# Patient Record
Sex: MALE | Race: ASIAN | Employment: FULL TIME | URBAN - METROPOLITAN AREA
[De-identification: names, ages, dates, MRNs, and addresses within clinical notes are randomized per-mention and may not be internally consistent; named-entity substitution may affect disease eponyms.]

---

## 2017-02-08 ENCOUNTER — OFFICE VISIT (OUTPATIENT)
Dept: FAMILY MEDICINE | Facility: CLINIC | Age: 52
End: 2017-02-08
Payer: COMMERCIAL

## 2017-02-08 VITALS
SYSTOLIC BLOOD PRESSURE: 126 MMHG | BODY MASS INDEX: 25.18 KG/M2 | HEIGHT: 69 IN | HEART RATE: 62 BPM | DIASTOLIC BLOOD PRESSURE: 82 MMHG | TEMPERATURE: 97.8 F | OXYGEN SATURATION: 98 % | WEIGHT: 170 LBS

## 2017-02-08 DIAGNOSIS — Z12.11 COLON CANCER SCREENING: ICD-10-CM

## 2017-02-08 DIAGNOSIS — I10 HYPERTENSION GOAL BP (BLOOD PRESSURE) < 140/90: ICD-10-CM

## 2017-02-08 DIAGNOSIS — Z12.5 SCREENING FOR PROSTATE CANCER: ICD-10-CM

## 2017-02-08 DIAGNOSIS — I25.10 CORONARY ARTERY DISEASE INVOLVING NATIVE CORONARY ARTERY OF NATIVE HEART WITHOUT ANGINA PECTORIS: ICD-10-CM

## 2017-02-08 DIAGNOSIS — Z91.018 FOOD ALLERGY: ICD-10-CM

## 2017-02-08 DIAGNOSIS — Z00.00 ENCOUNTER FOR ROUTINE ADULT HEALTH EXAMINATION WITHOUT ABNORMAL FINDINGS: Primary | ICD-10-CM

## 2017-02-08 PROCEDURE — 99396 PREV VISIT EST AGE 40-64: CPT | Performed by: PHYSICIAN ASSISTANT

## 2017-02-08 PROCEDURE — 80053 COMPREHEN METABOLIC PANEL: CPT | Performed by: PHYSICIAN ASSISTANT

## 2017-02-08 PROCEDURE — 80061 LIPID PANEL: CPT | Performed by: PHYSICIAN ASSISTANT

## 2017-02-08 PROCEDURE — 36415 COLL VENOUS BLD VENIPUNCTURE: CPT | Performed by: PHYSICIAN ASSISTANT

## 2017-02-08 NOTE — PROGRESS NOTES
SUBJECTIVE:     CC: Viviana Gilman is an 51 year old male who presents for preventative health visit.     Healthy Habits:    Do you get at least three servings of calcium containing foods daily (dairy, green leafy vegetables, etc.)? yes    Amount of exercise or daily activities, outside of work: 3-4 day(s) per week    Problems taking medications regularly No    Medication side effects: No    Have you had an eye exam in the past two years? yes    Do you see a dentist twice per year? yes    Do you have sleep apnea, excessive snoring or daytime drowsiness? no    Did complete his yearly cardiology visit for hx of CAD and things are going well with BP control and statin therapy. Was told to RTC in 2 years. Had med re-fills, but needs labs today. Non-smoker.        Today's PHQ-2 Score:   PHQ-2 ( 1999 Pfizer) 2/8/2017 1/25/2016   Q1: Little interest or pleasure in doing things 0 0   Q2: Feeling down, depressed or hopeless 0 0   PHQ-2 Score 0 0       Abuse: Current or Past(Physical, Sexual or Emotional)- No  Do you feel safe in your environment - Yes    Social History   Substance Use Topics     Smoking status: Never Smoker      Smokeless tobacco: Never Used     Alcohol Use: 0.0 oz/week     0 Standard drinks or equivalent per week      Comment: Occaisionally     The patient does not drink >3 drinks per day nor >7 drinks per week.    Last PSA: No results found for: PSA    Recent Labs   Lab Test 11/20/12  10/26/11   0720  10/26/11   0520   CHOL   --   111  112   HDL  50  33*  34*   LDL  83  39  40   TRIG  192*  195*  189*   CHOLHDLRATIO  3.4  3.4  3.3       Reviewed orders with patient. Reviewed health maintenance and updated orders accordingly - Yes    All Histories reviewed and updated in Epic.      ROS:  C: NEGATIVE for fever, chills, change in weight  I: NEGATIVE for worrisome rashes, moles or lesions  E: NEGATIVE for vision changes or irritation  ENT: + for occasionally noting a little lip swelling after ingestion of  "pineapple or tar root. He wonders if he has a food allergy. Has just been avoiding these things and hasn't had any issues. We discussed formal allergy testing, but he declines this right now. Does agree to f/u should he have any worsening or wish to pursue this.  R: NEGATIVE for significant cough or SOB  CV: NEGATIVE for chest pain, palpitations or peripheral edema  GI: NEGATIVE for nausea, abdominal pain, heartburn, or change in bowel habits   male: negative for dysuria, hematuria, decreased urinary stream, erectile dysfunction, urethral discharge  M: NEGATIVE for significant arthralgias or myalgia  N: NEGATIVE for weakness, dizziness or paresthesias  P: NEGATIVE for changes in mood or affect    Problem list, Medication list, Allergies, and Medical/Social/Surgical histories reviewed in [facility] and updated as appropriate.  OBJECTIVE:     /82 mmHg  Pulse 62  Temp(Src) 97.8  F (36.6  C) (Oral)  Ht 5' 9\" (1.753 m)  Wt 170 lb (77.111 kg)  BMI 25.09 kg/m2  SpO2 98%  EXAM:  GENERAL: healthy, alert and no distress  EYES: Eyes grossly normal to inspection, PERRL and conjunctivae and sclerae normal  HENT: ear canals and TM's normal, nose and mouth without ulcers or lesions  NECK: no adenopathy, no asymmetry, masses, or scars and thyroid normal to palpation  RESP: lungs clear to auscultation - no rales, rhonchi or wheezes  CV: regular rate and rhythm, normal S1 S2, no S3 or S4, no murmur, click or rub, no peripheral edema and peripheral pulses strong  ABDOMEN: soft, nontender, no hepatosplenomegaly, no masses and bowel sounds normal  MS: no gross musculoskeletal defects noted, no edema  SKIN: no suspicious lesions or rashes  NEURO: Normal strength and tone, mentation intact and speech normal  PSYCH: mentation appears normal, affect normal/bright    ASSESSMENT/PLAN:         ICD-10-CM    1. Encounter for routine adult health examination without abnormal findings Z00.00    2. Coronary artery disease involving native " "coronary artery of native heart without angina pectoris I25.10    3. Hypertension goal BP (blood pressure) < 140/90 I10 Comprehensive metabolic panel (BMP + Alb, Alk Phos, ALT, AST, Total. Bili, TP)   4. Screening for prostate cancer Z12.5 Lipid Profile with reflex to direct LDL   5. Colon cancer screening Z12.11 GASTROENTEROLOGY ADULT REF PROCEDURE ONLY   6. Food allergy - possible to pineapple or tar root Z91.018    See notes in ROS regarding possible allergy.  Will notify of lab results when available.  Continue care bi-annually with cardiology as planned.    COUNSELING:  Reviewed preventive health counseling, as reflected in patient instructions       Regular exercise       Healthy diet/nutrition       Colon cancer screening       Prostate cancer screening         reports that he has never smoked. He has never used smokeless tobacco.    Estimated body mass index is 25.09 kg/(m^2) as calculated from the following:    Height as of this encounter: 5' 9\" (1.753 m).    Weight as of this encounter: 170 lb (77.111 kg).     Counseling Resources:  ATP IV Guidelines  Pooled Cohorts Equation Calculator  FRAX Risk Assessment  ICSI Preventive Guidelines  Dietary Guidelines for Americans, 2010  USDA's MyPlate  ASA Prophylaxis  Lung CA Screening    Dorys Shannon PA-C  [facility]  "

## 2017-02-08 NOTE — NURSING NOTE
"Chief Complaint   Patient presents with     Physical    Pulse 62  Temp(Src) 97.8  F (36.6  C) (Oral)  Ht 5' 9\" (1.753 m)  Wt 170 lb (77.111 kg)  BMI 25.09 kg/m2  SpO2 98% Body Mass Index is Body mass index is 25.09 kg/(m^2).  BP completed using cuff size : regular right arm  Stephanie Crawley MA          "

## 2017-02-08 NOTE — MR AVS SNAPSHOT
After Visit Summary   2/8/2017    Viviana Gilman    MRN: [id number]           Patient Information     Date Of Birth          1965        Visit Information        Provider Department      2/8/2017 10:40 AM Dorys Shannon PA-C [facility]        Today's Diagnoses     Coronary artery disease involving native coronary artery of native heart without angina pectoris    -  1     Hypertension goal BP (blood pressure) < 140/90         Screening for prostate cancer         Colon cancer screening         Food allergy - possible to pineapple or tar root           Care Instructions      Preventive Health Recommendations  Male Ages 50 - 64    Yearly exam:             See your health care provider every year in order to  o   Review health changes.   o   Discuss preventive care.    o   Review your medicines if your doctor has prescribed any.     Have a cholesterol test every 5 years, or more frequently if you are at risk for high cholesterol/heart disease.     Have a diabetes test (fasting glucose) every three years. If you are at risk for diabetes, you should have this test more often.     Have a colonoscopy at age 50, or have a yearly FIT test (stool test). These exams will check for colon cancer.      Talk with your health care provider about whether or not a prostate cancer screening test (PSA) is right for you.    You should be tested each year for STDs (sexually transmitted diseases), if you re at risk.     Shots: Get a flu shot each year. Get a tetanus shot every 10 years.     Nutrition:    Eat at least 5 servings of fruits and vegetables daily.     Eat whole-grain bread, whole-wheat pasta and brown rice instead of white grains and rice.     Talk to your provider about Calcium and Vitamin D.     Lifestyle    Exercise for at least 150 minutes a week (30 minutes a day, 5 days a week). This will help you control your weight and prevent disease.     Limit alcohol to one drink per day.      No smoking.     Wear sunscreen to prevent skin cancer.     See your dentist every six months for an exam and cleaning.     See your eye doctor every 1 to 2 years.            Follow-ups after your visit        Additional Services     GASTROENTEROLOGY ADULT REF PROCEDURE ONLY       Last Lab Result: CREATININE (mg/dL)       Date                     Value                 10/26/2011               0.88             ----------  Body mass index is 25.09 kg/(m^2).      Patient will be contacted to schedule procedure.     Please be aware that coverage of these services is subject to the terms and limitations of your health insurance plan.  Call member services at your health plan with any benefit or coverage questions.  Any procedures must be performed at a [facility] facility OR coordinated by your clinic's referral office.    Please bring the following with you to your appointment:    (1) Any X-Rays, CTs or MRIs which have been performed.  Contact the facility where they were done to arrange for  prior to your scheduled appointment.    (2) List of current medications   (3) This referral request   (4) Any documents/labs given to you for this referral                  Who to contact     If you have questions or need follow up information about today's clinic visit or your schedule please contact [facility] [place] directly at [phone].  Normal or non-critical lab and imaging results will be communicated to you by MyChart, letter or phone within 4 business days after the clinic has received the results. If you do not hear from us within 7 days, please contact the clinic through MyChart or phone. If you have a critical or abnormal lab result, we will notify you by phone as soon as possible.  Submit refill requests through [employer] or call your pharmacy and they will forward the refill request to us. Please allow 3 business days for your refill to be completed.          Additional Information About Your Visit    "     MyChart Information     [employer] lets you send messages to your doctor, view your test results, renew your prescriptions, schedule appointments and more. To sign up, go to [email or web address] . Click on \"Log in\" on the left side of the screen, which will take you to the Welcome page. Then click on \"Sign up Now\" on the right side of the page.     You will be asked to enter the access code listed below, as well as some personal information. Please follow the directions to create your username and password.     Your access code is: [email or web address]  Expires: 2017 11:09 AM     Your access code will  in 90 days. If you need help or a new code, please call your [place] clinic or [phone].        Care EveryWhere ID     This is your [facility] EveryWhere ID. This could be used by other organizations to access your [place] medical records  [id number]        Your Vitals Were     Pulse Temperature Height BMI (Body Mass Index) Pulse Oximetry       62 97.8  F (36.6  C) (Oral) 5' 9\" (1.753 m) 25.09 kg/m2 98%        Blood Pressure from Last 3 Encounters:   17 126/82   16 138/100   16 132/88    Weight from Last 3 Encounters:   17 170 lb (77.111 kg)   16 184 lb (83.462 kg)   16 176 lb (79.833 kg)              We Performed the Following     Comprehensive metabolic panel (BMP + Alb, Alk Phos, ALT, AST, Total. Bili, TP)     GASTROENTEROLOGY ADULT REF PROCEDURE ONLY     Lipid Profile with reflex to direct LDL        Primary Care Provider    [facility]       No address on file        Thank you!     Thank you for choosing [facility]  for your care. Our goal is always to provide you with excellent care. Hearing back from our patients is one way we can continue to improve our services. Please take a few minutes to complete the written survey that you may receive in the mail after your visit with us. Thank you!             Your Updated Medication List - Protect " others around you: Learn how to safely use, store and throw away your medicines at www.disposemymeds.org.          This list is accurate as of: 2/8/17 11:17 AM.  Always use your most recent med list.                   Brand Name Dispense Instructions for use    aspirin 325 MG tablet      Take 325 mg by mouth every morning.       atorvastatin 40 MG tablet    LIPITOR    90 tablet    Take 1 tablet (40 mg) by mouth daily       lisinopril 5 MG tablet    PRINIVIL/ZESTRIL    90 tablet    Take 1 tablet (5 mg) by mouth daily

## 2017-02-08 NOTE — LETTER
[facility]     [address] [place] [address]                                           [phone]   February 16, 2017    Viviana Gilman  [address] [place] [id number]      Dear Viviana,    Here is a summary of your recent test results:    -Liver and gallbladder tests are normal. (ALT,AST, Alk phos, bilirubin), kidney function is normal (Cr, GFR), Sodium is normal, Potassium is normal, Calcium is normal, Glucose is normal (diabetes screening test).   -LDL(bad) cholesterol level is mildly elevated.  -HDL(good) cholesterol level is normal.  -Triglycerides are elevated which can increase your heart disease risk.  A diet high in fat and simple carbohydrates, genetics and being overweight can contribute to this. ADVISE: Exercise, weight control, and omega-3 fatty acids (fish oil) 1323-8721 mg daily are helpful to improve this.  Rechecking your cholesterol in 12 months is recommended (lipid w/ LDL reflex, DX: hypertriglyceridemia - 272.1).    Your test results are enclosed.      Thank you for choosing [facility].  We appreciate the opportunity to serve you and look forward to supporting your healthcare needs in the future.    If you have any questions or concerns, please call me or my staff at [phone].    Best regards,    Dorys Shannon PA-C      Results for orders placed or performed in visit on 02/08/17   Comprehensive metabolic panel (BMP + Alb, Alk Phos, ALT, AST, Total. Bili, TP)   Result Value Ref Range    Sodium 141 133 - 144 mmol/L    Potassium 4.2 3.4 - 5.3 mmol/L    Chloride 106 94 - 109 mmol/L    Carbon Dioxide 30 20 - 32 mmol/L    Anion Gap 5 3 - 14 mmol/L    Glucose 101 (H) 70 - 99 mg/dL    Urea Nitrogen 16 7 - 30 mg/dL    Creatinine 0.84 0.66 - 1.25 mg/dL    GFR Estimate >90  Non  GFR Calc   >60 mL/min/1.7m2    GFR Estimate If Black >90   GFR Calc   >60 mL/min/1.7m2    Calcium 9.4 8.5 - 10.1 mg/dL    Bilirubin Total 0.6  0.2 - 1.3 mg/dL    Albumin 4.2 3.4 - 5.0 g/dL    Protein Total 8.2 6.8 - 8.8 g/dL    Alkaline Phosphatase 69 40 - 150 U/L    ALT 32 0 - 70 U/L    AST 18 0 - 45 U/L   Lipid Profile with reflex to direct LDL   Result Value Ref Range    Cholesterol 212 (H) <200 mg/dL    Triglycerides 196 (H) <150 mg/dL    HDL Cholesterol 43 >39 mg/dL    LDL Cholesterol Calculated 130 (H) <100 mg/dL    Non HDL Cholesterol 169 (H) <130 mg/dL

## 2017-02-09 LAB
ALBUMIN SERPL-MCNC: 4.2 G/DL (ref 3.4–5)
ALP SERPL-CCNC: 69 U/L (ref 40–150)
ALT SERPL W P-5'-P-CCNC: 32 U/L (ref 0–70)
ANION GAP SERPL CALCULATED.3IONS-SCNC: 5 MMOL/L (ref 3–14)
AST SERPL W P-5'-P-CCNC: 18 U/L (ref 0–45)
BILIRUB SERPL-MCNC: 0.6 MG/DL (ref 0.2–1.3)
BUN SERPL-MCNC: 16 MG/DL (ref 7–30)
CALCIUM SERPL-MCNC: 9.4 MG/DL (ref 8.5–10.1)
CHLORIDE SERPL-SCNC: 106 MMOL/L (ref 94–109)
CHOLEST SERPL-MCNC: 212 MG/DL
CO2 SERPL-SCNC: 30 MMOL/L (ref 20–32)
CREAT SERPL-MCNC: 0.84 MG/DL (ref 0.66–1.25)
GFR SERPL CREATININE-BSD FRML MDRD: ABNORMAL ML/MIN/1.7M2
GLUCOSE SERPL-MCNC: 101 MG/DL (ref 70–99)
HDLC SERPL-MCNC: 43 MG/DL
LDLC SERPL CALC-MCNC: 130 MG/DL
NONHDLC SERPL-MCNC: 169 MG/DL
POTASSIUM SERPL-SCNC: 4.2 MMOL/L (ref 3.4–5.3)
PROT SERPL-MCNC: 8.2 G/DL (ref 6.8–8.8)
SODIUM SERPL-SCNC: 141 MMOL/L (ref 133–144)
TRIGL SERPL-MCNC: 196 MG/DL

## 2017-02-16 NOTE — PROGRESS NOTES
Please call or write patient with the following results:    -Liver and gallbladder tests are normal. (ALT,AST, Alk phos, bilirubin), kidney function is normal (Cr, GFR), Sodium is normal, Potassium is normal, Calcium is normal, Glucose is normal (diabetes screening test).   -LDL(bad) cholesterol level is mildly elevated.  -HDL(good) cholesterol level is normal.  -Triglycerides are elevated which can increase your heart disease risk.  A diet high in fat and simple carbohydrates, genetics and being overweight can contribute to this. ADVISE: Exercise, weight control, and omega-3 fatty acids (fish oil) 7452-6760 mg daily are helpful to improve this.  Rechecking your cholesterol in 12 months is recommended (lipid w/ LDL reflex, DX: hypertriglyceridemia - 272.1).    Electronically Signed By: Dorys Shannon PA-C

## 2017-02-23 ENCOUNTER — TELEPHONE (OUTPATIENT)
Dept: GASTROENTEROLOGY | Facility: CLINIC | Age: 52
End: 2017-02-23

## 2017-04-12 NOTE — TELEPHONE ENCOUNTER
Third attempt to reach patient to schedule Colonoscopy. Not Scheduled at [facility]. Left Messages.   
artificial rupture

## 2017-09-25 ENCOUNTER — TRANSFERRED RECORDS (OUTPATIENT)
Dept: HEALTH INFORMATION MANAGEMENT | Facility: CLINIC | Age: 52
End: 2017-09-25

## 2017-09-25 DIAGNOSIS — I10 ESSENTIAL HYPERTENSION: ICD-10-CM

## 2017-09-25 DIAGNOSIS — I25.2 OLD MYOCARDIAL INFARCTION: ICD-10-CM

## 2017-09-25 RX ORDER — ATORVASTATIN CALCIUM 40 MG/1
40 TABLET, FILM COATED ORAL DAILY
Qty: 90 TABLET | Refills: 0 | Status: SHIPPED | OUTPATIENT
Start: 2017-09-25 | End: 2018-04-11

## 2017-09-25 RX ORDER — LISINOPRIL 5 MG/1
5 TABLET ORAL DAILY
Qty: 90 TABLET | Refills: 0 | Status: SHIPPED | OUTPATIENT
Start: 2017-09-25 | End: 2018-04-11

## 2018-04-11 DIAGNOSIS — I10 ESSENTIAL HYPERTENSION: ICD-10-CM

## 2018-04-11 DIAGNOSIS — I25.2 OLD MYOCARDIAL INFARCTION: ICD-10-CM

## 2018-04-11 RX ORDER — LISINOPRIL 5 MG/1
5 TABLET ORAL DAILY
Qty: 90 TABLET | Refills: 0 | Status: SHIPPED | OUTPATIENT
Start: 2018-04-11 | End: 2018-06-20

## 2018-04-11 RX ORDER — ATORVASTATIN CALCIUM 40 MG/1
40 TABLET, FILM COATED ORAL DAILY
Qty: 90 TABLET | Refills: 0 | Status: ON HOLD | OUTPATIENT
Start: 2018-04-11 | End: 2019-03-06

## 2018-06-20 ENCOUNTER — OFFICE VISIT (OUTPATIENT)
Dept: CARDIOLOGY | Facility: CLINIC | Age: 53
End: 2018-06-20
Payer: COMMERCIAL

## 2018-06-20 VITALS
BODY MASS INDEX: 25.33 KG/M2 | DIASTOLIC BLOOD PRESSURE: 82 MMHG | WEIGHT: 171 LBS | HEART RATE: 68 BPM | HEIGHT: 69 IN | SYSTOLIC BLOOD PRESSURE: 135 MMHG

## 2018-06-20 DIAGNOSIS — I25.2 OLD MYOCARDIAL INFARCTION: ICD-10-CM

## 2018-06-20 DIAGNOSIS — E78.2 MIXED HYPERLIPIDEMIA: Primary | ICD-10-CM

## 2018-06-20 DIAGNOSIS — I10 ESSENTIAL HYPERTENSION: ICD-10-CM

## 2018-06-20 DIAGNOSIS — I10 HYPERTENSION GOAL BP (BLOOD PRESSURE) < 140/90: ICD-10-CM

## 2018-06-20 PROCEDURE — 99214 OFFICE O/P EST MOD 30 MIN: CPT | Performed by: INTERNAL MEDICINE

## 2018-06-20 RX ORDER — LISINOPRIL 10 MG/1
10 TABLET ORAL DAILY
Qty: 90 TABLET | Refills: 3 | Status: ON HOLD | OUTPATIENT
Start: 2018-06-20 | End: 2019-03-06

## 2018-06-20 NOTE — MR AVS SNAPSHOT
"              After Visit Summary   6/20/2018    Viviana Gilman    MRN: [id number]           Patient Information     Date Of Birth          1965        Visit Information        Provider Department      6/20/2018 2:15 PM Amauri Fleming MD [facility]   [place]        Today's Diagnoses     Mixed hyperlipidemia    -  1    Hypertension goal BP (blood pressure) < 140/90        Old myocardial infarction        Essential hypertension           Follow-ups after your visit        Who to contact     If you have questions or need follow up information about today's clinic visit or your schedule please contact [facility]   [place] directly at [phone].  Normal or non-critical lab and imaging results will be communicated to you by MyChart, letter or phone within 4 business days after the clinic has received the results. If you do not hear from us within 7 days, please contact the clinic through MyChart or phone. If you have a critical or abnormal lab result, we will notify you by phone as soon as possible.  Submit refill requests through [employer] or call your pharmacy and they will forward the refill request to us. Please allow 3 business days for your refill to be completed.          Additional Information About Your Visit        Care EveryWhere ID     This is your Care EveryWhere ID. This could be used by other organizations to access your [facility] medical records  [id number]        Your Vitals Were     Pulse Height BMI (Body Mass Index)             68 1.753 m (5' 9\") 25.25 kg/m2          Blood Pressure from Last 3 Encounters:   06/20/18 135/82   02/08/17 126/82   03/11/16 (!) 138/100    Weight from Last 3 Encounters:   06/20/18 77.6 kg (171 lb)   02/08/17 77.1 kg (170 lb)   03/11/16 83.5 kg (184 lb)              Today, you had the following     No orders found for display         Today's Medication Changes          These changes are accurate as of 6/20/18  " 2:51 PM.  If you have any questions, ask your nurse or doctor.               These medicines have changed or have updated prescriptions.        Dose/Directions    lisinopril 10 MG tablet   Commonly known as:  PRINIVIL/ZESTRIL   This may have changed:    - medication strength  - how much to take   Used for:  Essential hypertension   Changed by:  Amauri Fleming MD        Dose:  10 mg   Take 1 tablet (10 mg) by mouth daily   Quantity:  90 tablet   Refills:  3         Stop taking these medicines if you haven't already. Please contact your care team if you have questions.     aspirin 325 MG tablet   Stopped by:  Amauri Fleming MD                Where to get your medicines      These medications were sent to [facility] [place], [place] - [address]  [address] [facility] [address]     Phone:  [phone]     lisinopril 10 MG tablet                Primary Care Provider Office Phone # Fax #    Dorys Shannon PA-C [phone] [address]  [place] [address]        Equal Access to Services     [facility]: Hadii aad ku hadasho Soomaali, waaxda luqadaha, qaybta kaalmada adeegyada, waxay adonisin hayfilemonn marshall romo . So [facility] [phone].    ATENCIÓN: Si habla español, tiene a toribio disposición servicios gratuitos de asistencia lingüística. Llame al [phone].    We comply with applicable federal civil rights laws and [place] laws. We do not discriminate on the basis of race, color, national origin, age, disability, sex, sexual orientation, or gender identity.            Thank you!     Thank you for choosing [facility]  for your care. Our goal is always to provide you with excellent care. Hearing back from our patients is one way we can continue to improve our services. Please take a few minutes to complete the written survey that you may receive in the mail after your visit with us. Thank  you!             Your Updated Medication List - Protect others around you: Learn how to safely use, store and throw away your medicines at www.disposemymeds.org.          This list is accurate as of 6/20/18  2:51 PM.  Always use your most recent med list.                   Brand Name Dispense Instructions for use Diagnosis    ASPIRIN PO      Take 81 mg by mouth daily        atorvastatin 40 MG tablet    LIPITOR    90 tablet    Take 1 tablet (40 mg) by mouth daily    Old myocardial infarction, Essential hypertension       lisinopril 10 MG tablet    PRINIVIL/ZESTRIL    90 tablet    Take 1 tablet (10 mg) by mouth daily    Essential hypertension

## 2018-06-20 NOTE — LETTER
"6/20/2018    Dorys Shannon PA-C  [address]  [facility] [place], [place] [address]    RE: Viviana Gilman       Dear Colleague,    I had the pleasure of seeing Viviana Gilman in the [facility].    Cardiology Progress Note          Assessment and Plan:     1. Coronary artery disease, premature with PCI to the LAD and obtuse marginal 2011    Continue medical management.  Blood pressure suboptimal.  Increase lisinopril from 5 mg to 10 mg.  If patient remains asymptomatic, may follow-up with his primary physician for refills and see us as needed.    This note was transcribed using electronic voice recognition software and there may be typographical errors present.                Interval History:   The patient is a very pleasant 52 year old whom I have been seeing for coronary artery disease status post PCI of the LAD and obtuse marginal in 2011.  He has intermittently stopped taking his medications over the years.  He comes in for follow-up at this time.  He feels well without any exertional discomfort.  He assures me that he is taking his atorvastatin and lisinopril 5 mg daily.                     Review of Systems:   Review of Systems:  Skin:  Negative     Eyes:  Positive for glasses  ENT:  Negative    Respiratory:  Negative    Cardiovascular:  Negative    Gastroenterology: Negative    Genitourinary:  not assessed    Musculoskeletal:  Negative    Neurologic:  Negative    Psychiatric:  Negative    Heme/Lymph/Imm:  Negative    Endocrine:  Negative                Physical Exam:     Vitals: /82  Pulse 68  Ht 1.753 m (5' 9\")  Wt 77.6 kg (171 lb)  BMI 25.25 kg/m2  Constitutional:  cooperative, alert and oriented, well developed, well nourished, in no acute distress        Skin:  warm and dry to the touch, no apparent skin lesions or masses noted        Head:  normocephalic, no masses or lesions        Eyes:  pupils equal and round, conjunctivae and lids unremarkable, sclera white, no xanthalasma, " EOMS intact, no nystagmus        ENT:  no pallor or cyanosis, dentition good        Neck:  JVP normal;no carotid bruit        Chest:  normal breath sounds, clear to auscultation, normal A-P diameter, normal symmetry, normal respiratory excursion, no use of accessory muscles        Cardiac: regular rhythm;no murmurs, gallops or rubs detected                  Abdomen:      benign    Extremities and Back:  no deformities, clubbing, cyanosis, erythema observed;no edema        Neurological:  no gross motor deficits;affect appropriate                 Medications:     Current Outpatient Prescriptions   Medication Sig Dispense Refill     ASPIRIN PO Take 81 mg by mouth daily       lisinopril (PRINIVIL/ZESTRIL) 10 MG tablet Take 1 tablet (10 mg) by mouth daily 90 tablet 3     atorvastatin (LIPITOR) 40 MG tablet Take 1 tablet (40 mg) by mouth daily 90 tablet 0     [DISCONTINUED] lisinopril (PRINIVIL/ZESTRIL) 5 MG tablet Take 1 tablet (5 mg) by mouth daily 90 tablet 0                Data:   All laboratory data reviewed  No results found for this or any previous visit (from the past 24 hour(s)).    All laboratory data reviewed  Lab Results   Component Value Date    CHOL 212 02/08/2017     Lab Results   Component Value Date    HDL 43 02/08/2017     Lab Results   Component Value Date     02/08/2017     Lab Results   Component Value Date    TRIG 196 02/08/2017     Lab Results   Component Value Date    CHOLHDLRATIO 3.4 11/20/2012     No results found for: TSH  Last Basic Metabolic Panel:  Lab Results   Component Value Date     02/08/2017      Lab Results   Component Value Date    POTASSIUM 4.2 02/08/2017     Lab Results   Component Value Date    CHLORIDE 106 02/08/2017     Lab Results   Component Value Date    JAY 9.4 02/08/2017     Lab Results   Component Value Date    CO2 30 02/08/2017     Lab Results   Component Value Date    BUN 16 02/08/2017     Lab Results   Component Value Date    CR 0.84 02/08/2017     Lab  Results   Component Value Date     02/08/2017     Lab Results   Component Value Date    WBC 7.6 10/26/2011     Lab Results   Component Value Date    RBC 5.26 10/26/2011     Lab Results   Component Value Date    HGB 13.9 10/26/2011     Lab Results   Component Value Date    HCT 42.4 10/26/2011     Lab Results   Component Value Date    MCV 81 10/26/2011     Lab Results   Component Value Date    MCH 26.4 10/26/2011     Lab Results   Component Value Date    MCHC 32.8 10/26/2011     Lab Results   Component Value Date    RDW 14.1 10/26/2011     Lab Results   Component Value Date     10/26/2011       Thank you for allowing me to participate in the care of your patient.    Sincerely,     Amauri Fleming MD     [facility]

## 2018-06-20 NOTE — PROGRESS NOTES
"Cardiology Progress Note          Assessment and Plan:     1. Coronary artery disease, premature with PCI to the LAD and obtuse marginal 2011    Continue medical management.  Blood pressure suboptimal.  Increase lisinopril from 5 mg to 10 mg.  If patient remains asymptomatic, may follow-up with his primary physician for refills and see us as needed.    This note was transcribed using electronic voice recognition software and there may be typographical errors present.                Interval History:   The patient is a very pleasant 52 year old whom I have been seeing for coronary artery disease status post PCI of the LAD and obtuse marginal in 2011.  He has intermittently stopped taking his medications over the years.  He comes in for follow-up at this time.  He feels well without any exertional discomfort.  He assures me that he is taking his atorvastatin and lisinopril 5 mg daily.                     Review of Systems:   Review of Systems:  Skin:  Negative     Eyes:  Positive for glasses  ENT:  Negative    Respiratory:  Negative    Cardiovascular:  Negative    Gastroenterology: Negative    Genitourinary:  not assessed    Musculoskeletal:  Negative    Neurologic:  Negative    Psychiatric:  Negative    Heme/Lymph/Imm:  Negative    Endocrine:  Negative                Physical Exam:     Vitals: /82  Pulse 68  Ht 1.753 m (5' 9\")  Wt 77.6 kg (171 lb)  BMI 25.25 kg/m2  Constitutional:  cooperative, alert and oriented, well developed, well nourished, in no acute distress        Skin:  warm and dry to the touch, no apparent skin lesions or masses noted        Head:  normocephalic, no masses or lesions        Eyes:  pupils equal and round, conjunctivae and lids unremarkable, sclera white, no xanthalasma, EOMS intact, no nystagmus        ENT:  no pallor or cyanosis, dentition good        Neck:  JVP normal;no carotid bruit        Chest:  normal breath sounds, clear to auscultation, normal A-P diameter, normal " symmetry, normal respiratory excursion, no use of accessory muscles        Cardiac: regular rhythm;no murmurs, gallops or rubs detected                  Abdomen:      benign    Extremities and Back:  no deformities, clubbing, cyanosis, erythema observed;no edema        Neurological:  no gross motor deficits;affect appropriate                 Medications:     Current Outpatient Prescriptions   Medication Sig Dispense Refill     ASPIRIN PO Take 81 mg by mouth daily       lisinopril (PRINIVIL/ZESTRIL) 10 MG tablet Take 1 tablet (10 mg) by mouth daily 90 tablet 3     atorvastatin (LIPITOR) 40 MG tablet Take 1 tablet (40 mg) by mouth daily 90 tablet 0     [DISCONTINUED] lisinopril (PRINIVIL/ZESTRIL) 5 MG tablet Take 1 tablet (5 mg) by mouth daily 90 tablet 0                Data:   All laboratory data reviewed  No results found for this or any previous visit (from the past 24 hour(s)).    All laboratory data reviewed  Lab Results   Component Value Date    CHOL 212 02/08/2017     Lab Results   Component Value Date    HDL 43 02/08/2017     Lab Results   Component Value Date     02/08/2017     Lab Results   Component Value Date    TRIG 196 02/08/2017     Lab Results   Component Value Date    CHOLHDLRATIO 3.4 11/20/2012     No results found for: TSH  Last Basic Metabolic Panel:  Lab Results   Component Value Date     02/08/2017      Lab Results   Component Value Date    POTASSIUM 4.2 02/08/2017     Lab Results   Component Value Date    CHLORIDE 106 02/08/2017     Lab Results   Component Value Date    JAY 9.4 02/08/2017     Lab Results   Component Value Date    CO2 30 02/08/2017     Lab Results   Component Value Date    BUN 16 02/08/2017     Lab Results   Component Value Date    CR 0.84 02/08/2017     Lab Results   Component Value Date     02/08/2017     Lab Results   Component Value Date    WBC 7.6 10/26/2011     Lab Results   Component Value Date    RBC 5.26 10/26/2011     Lab Results   Component Value  Date    HGB 13.9 10/26/2011     Lab Results   Component Value Date    HCT 42.4 10/26/2011     Lab Results   Component Value Date    MCV 81 10/26/2011     Lab Results   Component Value Date    MCH 26.4 10/26/2011     Lab Results   Component Value Date    MCHC 32.8 10/26/2011     Lab Results   Component Value Date    RDW 14.1 10/26/2011     Lab Results   Component Value Date     10/26/2011

## 2018-09-12 ENCOUNTER — OFFICE VISIT (OUTPATIENT)
Dept: FAMILY MEDICINE | Facility: CLINIC | Age: 53
End: 2018-09-12
Payer: COMMERCIAL

## 2018-09-12 VITALS
OXYGEN SATURATION: 97 % | TEMPERATURE: 97.8 F | BODY MASS INDEX: 25.7 KG/M2 | HEART RATE: 64 BPM | DIASTOLIC BLOOD PRESSURE: 84 MMHG | WEIGHT: 174 LBS | SYSTOLIC BLOOD PRESSURE: 144 MMHG

## 2018-09-12 DIAGNOSIS — M79.672 PAIN OF LEFT HEEL: Primary | ICD-10-CM

## 2018-09-12 PROCEDURE — 99213 OFFICE O/P EST LOW 20 MIN: CPT | Performed by: FAMILY MEDICINE

## 2018-09-12 NOTE — MR AVS SNAPSHOT
After Visit Summary   9/12/2018    Viviana Gilman    MRN: [id number]           Patient Information     Date Of Birth          1965        Visit Information        Provider Department      9/12/2018 2:40 PM Emil Lucero MD [facility]        Today's Diagnoses     Pain of left heel    -  1       Follow-ups after your visit        Additional Services     PHYSICAL THERAPY REFERRAL                 Follow-up notes from your care team     Return if symptoms worsen or fail to improve.      Who to contact     If you have questions or need follow up information about today's clinic visit or your schedule please contact [facility] at [phone].  Normal or non-critical lab and imaging results will be communicated to you by MyChart, letter or phone within 4 business days after the clinic has received the results. If you do not hear from us within 7 days, please contact the clinic through MyChart or phone. If you have a critical or abnormal lab result, we will notify you by phone as soon as possible.  Submit refill requests through in[employer] or call your pharmacy and they will forward the refill request to us. Please allow 3 business days for your refill to be completed.          Additional Information About Your Visit        Care EveryWhere ID     This is your Care EveryWhere ID. This could be used by other organizations to access your [place] medical records  [id number]        Your Vitals Were     Pulse Temperature Pulse Oximetry BMI (Body Mass Index)          64 97.8  F (36.6  C) (Oral) 97% 25.7 kg/m2         Blood Pressure from Last 3 Encounters:   09/12/18 144/84   06/20/18 135/82   02/08/17 126/82    Weight from Last 3 Encounters:   09/12/18 174 lb (78.9 kg)   06/20/18 171 lb (77.6 kg)   02/08/17 170 lb (77.1 kg)              We Performed the Following     PHYSICAL THERAPY REFERRAL        Primary Care Provider Office Phone # Fax #    Dorys Vanessa PA-C  [phone]       [address]  [place] [address]        Equal Access to Services     SKIP SALEH : Hadii naveen lucero manuel Sotone, waaxda luqadaha, qaybta kaalmada khadijah, yee dianelebron felix. So [facility] [phone].    ATENCIÓN: Si habla español, tiene a toribio disposición servicios gratuitos de asistencia lingüística. Llame al [phone].    We comply with applicable federal civil rights laws and [place] laws. We do not discriminate on the basis of race, color, national origin, age, disability, sex, sexual orientation, or gender identity.            Thank you!     Thank you for choosing [facility]  for your care. Our goal is always to provide you with excellent care. Hearing back from our patients is one way we can continue to improve our services. Please take a few minutes to complete the written survey that you may receive in the mail after your visit with us. Thank you!             Your Updated Medication List - Protect others around you: Learn how to safely use, store and throw away your medicines at www.disposemymeds.org.          This list is accurate as of 9/12/18  3:37 PM.  Always use your most recent med list.                   Brand Name Dispense Instructions for use Diagnosis    ASPIRIN PO      Take 81 mg by mouth daily        atorvastatin 40 MG tablet    LIPITOR    90 tablet    Take 1 tablet (40 mg) by mouth daily    Old myocardial infarction, Essential hypertension       lisinopril 10 MG tablet    PRINIVIL/ZESTRIL    90 tablet    Take 1 tablet (10 mg) by mouth daily    Essential hypertension

## 2018-09-12 NOTE — PROGRESS NOTES
SUBJECTIVE:   Viviana Gilman is a 53 year old male who presents to clinic today for the following health issues:      Joint Pain    Onset: 4 days    Description:   Location: left heel  Character: Sharp    Intensity: 10/10 when stepping on it    Progression of Symptoms: worse    Accompanying Signs & Symptoms:  Other symptoms: none    History:   Previous similar pain: no       Precipitating factors:   Trauma or overuse: no     Alleviating factors:  Improved by: nothing    Therapies Tried and outcome: ibuprofen      ROS:  General, neuro, sleep, psych, musculoskeletal system otherwise negative.    /84  Pulse 64  Temp 97.8  F (36.6  C) (Oral)  Wt 174 lb (78.9 kg)  SpO2 97%  BMI 25.7 kg/m2    L heel tender at achilles insertion and around   No plantar pain.   Squeeze neg   Normal ROM and strength.  NV intact    ASSESSMENT:  1. Pain of left heel  Ice, rest, advil  Pt instructed to come back to the clinic for worsening sx  Discussed bursitis vs tendonitis     - PHYSICAL THERAPY REFERRAL

## 2019-03-04 ENCOUNTER — APPOINTMENT (OUTPATIENT)
Dept: GENERAL RADIOLOGY | Facility: CLINIC | Age: 54
DRG: 247 | End: 2019-03-04
Payer: COMMERCIAL

## 2019-03-04 ENCOUNTER — ALLIED HEALTH/NURSE VISIT (OUTPATIENT)
Dept: NURSING | Facility: CLINIC | Age: 54
End: 2019-03-04
Payer: COMMERCIAL

## 2019-03-04 ENCOUNTER — HOSPITAL ENCOUNTER (INPATIENT)
Facility: CLINIC | Age: 54
LOS: 3 days | Discharge: HOME | DRG: 247 | End: 2019-03-07
Attending: INTERNAL MEDICINE | Admitting: HOSPITALIST
Payer: COMMERCIAL

## 2019-03-04 VITALS
DIASTOLIC BLOOD PRESSURE: 94 MMHG | SYSTOLIC BLOOD PRESSURE: 154 MMHG | OXYGEN SATURATION: 98 % | BODY MASS INDEX: 26.14 KG/M2 | HEART RATE: 64 BPM | TEMPERATURE: 97.8 F | WEIGHT: 177 LBS | RESPIRATION RATE: 14 BRPM

## 2019-03-04 DIAGNOSIS — I25.10 CORONARY ARTERY DISEASE INVOLVING NATIVE CORONARY ARTERY WITHOUT ANGINA PECTORIS: ICD-10-CM

## 2019-03-04 DIAGNOSIS — R07.9 CHEST PAIN, UNSPECIFIED TYPE: ICD-10-CM

## 2019-03-04 DIAGNOSIS — I24.9 ACS (ACUTE CORONARY SYNDROME) (H): Primary | ICD-10-CM

## 2019-03-04 DIAGNOSIS — I10 ESSENTIAL HYPERTENSION: ICD-10-CM

## 2019-03-04 DIAGNOSIS — I21.4 NSTEMI (NON-ST ELEVATED MYOCARDIAL INFARCTION) (H): ICD-10-CM

## 2019-03-04 DIAGNOSIS — I25.2 OLD MYOCARDIAL INFARCTION: ICD-10-CM

## 2019-03-04 DIAGNOSIS — I25.10 CORONARY ARTERY DISEASE INVOLVING NATIVE CORONARY ARTERY OF NATIVE HEART WITHOUT ANGINA PECTORIS: ICD-10-CM

## 2019-03-04 DIAGNOSIS — I25.2 OLD MYOCARDIAL INFARCTION: Primary | ICD-10-CM

## 2019-03-04 LAB
ANION GAP SERPL CALCULATED.3IONS-SCNC: 6 MMOL/L (ref 3–14)
BASOPHILS # BLD AUTO: 0.1 10E9/L (ref 0–0.2)
BASOPHILS NFR BLD AUTO: 0.9 %
BUN SERPL-MCNC: 16 MG/DL (ref 7–30)
CALCIUM SERPL-MCNC: 9.4 MG/DL (ref 8.5–10.1)
CHLORIDE SERPL-SCNC: 104 MMOL/L (ref 94–109)
CO2 SERPL-SCNC: 28 MMOL/L (ref 20–32)
CREAT SERPL-MCNC: 0.94 MG/DL (ref 0.66–1.25)
DIFFERENTIAL METHOD BLD: NORMAL
EOSINOPHIL # BLD AUTO: 0.2 10E9/L (ref 0–0.7)
EOSINOPHIL NFR BLD AUTO: 2.4 %
ERYTHROCYTE [DISTWIDTH] IN BLOOD BY AUTOMATED COUNT: 14.2 % (ref 10–15)
ERYTHROCYTE [DISTWIDTH] IN BLOOD BY AUTOMATED COUNT: 14.3 % (ref 10–15)
GFR SERPL CREATININE-BSD FRML MDRD: >90 ML/MIN/{1.73_M2}
GLUCOSE SERPL-MCNC: 91 MG/DL (ref 70–99)
HCT VFR BLD AUTO: 45.7 % (ref 40–53)
HCT VFR BLD AUTO: 47.9 % (ref 40–53)
HGB BLD-MCNC: 14.7 G/DL (ref 13.3–17.7)
HGB BLD-MCNC: 15.2 G/DL (ref 13.3–17.7)
IMM GRANULOCYTES # BLD: 0 10E9/L (ref 0–0.4)
IMM GRANULOCYTES NFR BLD: 0.2 %
INTERPRETATION ECG - MUSE: NORMAL
LMWH PPP CHRO-ACNC: 0.47 IU/ML
LYMPHOCYTES # BLD AUTO: 2.5 10E9/L (ref 0.8–5.3)
LYMPHOCYTES NFR BLD AUTO: 38.6 %
MCH RBC QN AUTO: 26.8 PG (ref 26.5–33)
MCH RBC QN AUTO: 27.3 PG (ref 26.5–33)
MCHC RBC AUTO-ENTMCNC: 31.7 G/DL (ref 31.5–36.5)
MCHC RBC AUTO-ENTMCNC: 32.2 G/DL (ref 31.5–36.5)
MCV RBC AUTO: 84 FL (ref 78–100)
MCV RBC AUTO: 85 FL (ref 78–100)
MONOCYTES # BLD AUTO: 0.4 10E9/L (ref 0–1.3)
MONOCYTES NFR BLD AUTO: 6.6 %
NEUTROPHILS # BLD AUTO: 3.3 10E9/L (ref 1.6–8.3)
NEUTROPHILS NFR BLD AUTO: 51.3 %
NRBC # BLD AUTO: 0 10*3/UL
NRBC BLD AUTO-RTO: 0 /100
PLATELET # BLD AUTO: 164 10E9/L (ref 150–450)
PLATELET # BLD AUTO: 165 10E9/L (ref 150–450)
POTASSIUM SERPL-SCNC: 3.8 MMOL/L (ref 3.4–5.3)
RBC # BLD AUTO: 5.39 10E12/L (ref 4.4–5.9)
RBC # BLD AUTO: 5.68 10E12/L (ref 4.4–5.9)
SODIUM SERPL-SCNC: 138 MMOL/L (ref 133–144)
TROPONIN I BLD-MCNC: 0.1 UG/L (ref 0–0.08)
TROPONIN I SERPL-MCNC: 0.11 UG/L (ref 0–0.04)
TROPONIN I SERPL-MCNC: 0.15 UG/L (ref 0–0.04)
TROPONIN I SERPL-MCNC: 0.16 UG/L (ref 0–0.04)
WBC # BLD AUTO: 6.4 10E9/L (ref 4–11)
WBC # BLD AUTO: 6.7 10E9/L (ref 4–11)

## 2019-03-04 PROCEDURE — 93005 ELECTROCARDIOGRAM TRACING: CPT

## 2019-03-04 PROCEDURE — 25000128 H RX IP 250 OP 636: Performed by: PHYSICIAN ASSISTANT

## 2019-03-04 PROCEDURE — 84484 ASSAY OF TROPONIN QUANT: CPT | Performed by: PHYSICIAN ASSISTANT

## 2019-03-04 PROCEDURE — 36415 COLL VENOUS BLD VENIPUNCTURE: CPT | Performed by: HOSPITALIST

## 2019-03-04 PROCEDURE — 25000132 ZZH RX MED GY IP 250 OP 250 PS 637: Performed by: PHYSICIAN ASSISTANT

## 2019-03-04 PROCEDURE — 12000000 ZZH R&B MED SURG/OB

## 2019-03-04 PROCEDURE — 99221 1ST HOSP IP/OBS SF/LOW 40: CPT | Performed by: INTERNAL MEDICINE

## 2019-03-04 PROCEDURE — 96365 THER/PROPH/DIAG IV INF INIT: CPT

## 2019-03-04 PROCEDURE — 36415 COLL VENOUS BLD VENIPUNCTURE: CPT | Performed by: PHYSICIAN ASSISTANT

## 2019-03-04 PROCEDURE — 25000132 ZZH RX MED GY IP 250 OP 250 PS 637: Performed by: HOSPITALIST

## 2019-03-04 PROCEDURE — 85025 COMPLETE CBC W/AUTO DIFF WBC: CPT | Performed by: PHYSICIAN ASSISTANT

## 2019-03-04 PROCEDURE — 85520 HEPARIN ASSAY: CPT | Performed by: HOSPITALIST

## 2019-03-04 PROCEDURE — 99285 EMERGENCY DEPT VISIT HI MDM: CPT | Mod: 25

## 2019-03-04 PROCEDURE — 71046 X-RAY EXAM CHEST 2 VIEWS: CPT

## 2019-03-04 PROCEDURE — 84484 ASSAY OF TROPONIN QUANT: CPT

## 2019-03-04 PROCEDURE — 80048 BASIC METABOLIC PNL TOTAL CA: CPT | Performed by: PHYSICIAN ASSISTANT

## 2019-03-04 PROCEDURE — 99223 1ST HOSP IP/OBS HIGH 75: CPT | Mod: AI | Performed by: PHYSICIAN ASSISTANT

## 2019-03-04 PROCEDURE — 25000132 ZZH RX MED GY IP 250 OP 250 PS 637: Performed by: INTERNAL MEDICINE

## 2019-03-04 PROCEDURE — 85027 COMPLETE CBC AUTOMATED: CPT | Performed by: PHYSICIAN ASSISTANT

## 2019-03-04 RX ORDER — NALOXONE HYDROCHLORIDE 0.4 MG/ML
.1-.4 INJECTION, SOLUTION INTRAMUSCULAR; INTRAVENOUS; SUBCUTANEOUS
Status: DISCONTINUED | OUTPATIENT
Start: 2019-03-04 | End: 2019-03-07 | Stop reason: HOSPADM

## 2019-03-04 RX ORDER — ACETAMINOPHEN 650 MG/1
650 SUPPOSITORY RECTAL EVERY 4 HOURS PRN
Status: DISCONTINUED | OUTPATIENT
Start: 2019-03-04 | End: 2019-03-07 | Stop reason: HOSPADM

## 2019-03-04 RX ORDER — ONDANSETRON 2 MG/ML
4 INJECTION INTRAMUSCULAR; INTRAVENOUS EVERY 6 HOURS PRN
Status: DISCONTINUED | OUTPATIENT
Start: 2019-03-04 | End: 2019-03-07 | Stop reason: HOSPADM

## 2019-03-04 RX ORDER — NITROGLYCERIN 0.4 MG/1
0.4 TABLET SUBLINGUAL
Status: COMPLETED | OUTPATIENT
Start: 2019-03-04 | End: 2019-03-04

## 2019-03-04 RX ORDER — ASPIRIN 81 MG/1
81 TABLET, CHEWABLE ORAL DAILY
Status: DISCONTINUED | OUTPATIENT
Start: 2019-03-05 | End: 2019-03-05 | Stop reason: DRUGHIGH

## 2019-03-04 RX ORDER — PROCHLORPERAZINE 25 MG
25 SUPPOSITORY, RECTAL RECTAL EVERY 12 HOURS PRN
Status: DISCONTINUED | OUTPATIENT
Start: 2019-03-04 | End: 2019-03-07 | Stop reason: HOSPADM

## 2019-03-04 RX ORDER — CARVEDILOL 3.12 MG/1
3.12 TABLET ORAL 2 TIMES DAILY WITH MEALS
Status: DISCONTINUED | OUTPATIENT
Start: 2019-03-04 | End: 2019-03-07 | Stop reason: HOSPADM

## 2019-03-04 RX ORDER — ALUMINA, MAGNESIA, AND SIMETHICONE 2400; 2400; 240 MG/30ML; MG/30ML; MG/30ML
30 SUSPENSION ORAL EVERY 4 HOURS PRN
Status: DISCONTINUED | OUTPATIENT
Start: 2019-03-04 | End: 2019-03-07 | Stop reason: HOSPADM

## 2019-03-04 RX ORDER — LIDOCAINE 40 MG/G
CREAM TOPICAL
Status: DISCONTINUED | OUTPATIENT
Start: 2019-03-04 | End: 2019-03-07 | Stop reason: HOSPADM

## 2019-03-04 RX ORDER — ATORVASTATIN CALCIUM 40 MG/1
80 TABLET, FILM COATED ORAL DAILY
Status: DISCONTINUED | OUTPATIENT
Start: 2019-03-04 | End: 2019-03-07 | Stop reason: HOSPADM

## 2019-03-04 RX ORDER — LORAZEPAM 0.5 MG/1
0.5 TABLET ORAL EVERY 4 HOURS PRN
Status: DISCONTINUED | OUTPATIENT
Start: 2019-03-04 | End: 2019-03-07 | Stop reason: HOSPADM

## 2019-03-04 RX ORDER — LISINOPRIL 10 MG/1
10 TABLET ORAL DAILY
Status: DISCONTINUED | OUTPATIENT
Start: 2019-03-04 | End: 2019-03-07 | Stop reason: HOSPADM

## 2019-03-04 RX ORDER — ASPIRIN 81 MG/1
81 TABLET ORAL EVERY EVENING
Status: ON HOLD | COMMUNITY
End: 2019-03-06

## 2019-03-04 RX ORDER — SODIUM CHLORIDE 9 MG/ML
INJECTION, SOLUTION INTRAVENOUS CONTINUOUS
Status: DISCONTINUED | OUTPATIENT
Start: 2019-03-04 | End: 2019-03-04

## 2019-03-04 RX ORDER — NITROGLYCERIN 0.4 MG/1
0.4 TABLET SUBLINGUAL EVERY 5 MIN PRN
Status: DISCONTINUED | OUTPATIENT
Start: 2019-03-04 | End: 2019-03-04

## 2019-03-04 RX ORDER — LISINOPRIL 5 MG/1
5 TABLET ORAL DAILY
Status: DISCONTINUED | OUTPATIENT
Start: 2019-03-04 | End: 2019-03-04

## 2019-03-04 RX ORDER — ACETAMINOPHEN 325 MG/1
650 TABLET ORAL EVERY 4 HOURS PRN
Status: DISCONTINUED | OUTPATIENT
Start: 2019-03-04 | End: 2019-03-05

## 2019-03-04 RX ORDER — ASPIRIN 81 MG/1
324 TABLET, CHEWABLE ORAL ONCE
Status: COMPLETED | OUTPATIENT
Start: 2019-03-04 | End: 2019-03-04

## 2019-03-04 RX ORDER — MORPHINE SULFATE 2 MG/ML
2-4 INJECTION, SOLUTION INTRAMUSCULAR; INTRAVENOUS
Status: DISCONTINUED | OUTPATIENT
Start: 2019-03-04 | End: 2019-03-07 | Stop reason: HOSPADM

## 2019-03-04 RX ORDER — PROCHLORPERAZINE MALEATE 5 MG
10 TABLET ORAL EVERY 6 HOURS PRN
Status: DISCONTINUED | OUTPATIENT
Start: 2019-03-04 | End: 2019-03-07 | Stop reason: HOSPADM

## 2019-03-04 RX ORDER — ONDANSETRON 4 MG/1
4 TABLET, ORALLY DISINTEGRATING ORAL EVERY 6 HOURS PRN
Status: DISCONTINUED | OUTPATIENT
Start: 2019-03-04 | End: 2019-03-07 | Stop reason: HOSPADM

## 2019-03-04 RX ORDER — NITROGLYCERIN 0.4 MG/1
0.4 TABLET SUBLINGUAL EVERY 5 MIN PRN
Status: DISCONTINUED | OUTPATIENT
Start: 2019-03-04 | End: 2019-03-05

## 2019-03-04 RX ADMIN — ASPIRIN 81 MG 324 MG: 81 TABLET ORAL at 12:50

## 2019-03-04 RX ADMIN — CARVEDILOL 3.12 MG: 3.12 TABLET, FILM COATED ORAL at 18:33

## 2019-03-04 RX ADMIN — NITROGLYCERIN 0.4 MG: 0.4 TABLET SUBLINGUAL at 13:32

## 2019-03-04 RX ADMIN — NITROGLYCERIN 0.4 MG: 0.4 TABLET SUBLINGUAL at 13:06

## 2019-03-04 RX ADMIN — Medication 4400 UNITS: at 13:49

## 2019-03-04 RX ADMIN — ALUMINUM HYDROXIDE, MAGNESIUM HYDROXIDE, AND DIMETHICONE 30 ML: 400; 400; 40 SUSPENSION ORAL at 19:20

## 2019-03-04 RX ADMIN — LISINOPRIL 10 MG: 10 TABLET ORAL at 19:21

## 2019-03-04 RX ADMIN — HEPARIN SODIUM 900 UNITS/HR: 10000 INJECTION, SOLUTION INTRAVENOUS at 13:48

## 2019-03-04 RX ADMIN — NITROGLYCERIN 0.4 MG: 0.4 TABLET SUBLINGUAL at 12:50

## 2019-03-04 RX ADMIN — ATORVASTATIN CALCIUM 80 MG: 40 TABLET, FILM COATED ORAL at 19:21

## 2019-03-04 RX ADMIN — ACETAMINOPHEN 650 MG: 325 TABLET, FILM COATED ORAL at 23:41

## 2019-03-04 ASSESSMENT — ACTIVITIES OF DAILY LIVING (ADL)
RETIRED_COMMUNICATION: 0-->UNDERSTANDS/COMMUNICATES WITHOUT DIFFICULTY
COGNITION: 0 - NO COGNITION ISSUES REPORTED
WHICH_OF_THE_ABOVE_FUNCTIONAL_RISKS_HAD_A_RECENT_ONSET_OR_CHANGE?: AMBULATION
TOILETING: 0-->INDEPENDENT
ADLS_ACUITY_SCORE: 11
PRIOR_FUNCTIONAL_LEVEL_COMMENT: INDEPENDENT
RETIRED_EATING: 0-->INDEPENDENT
DRESS: 0-->INDEPENDENT
SWALLOWING: 0-->SWALLOWS FOODS/LIQUIDS WITHOUT DIFFICULTY
ADLS_ACUITY_SCORE: 16
BATHING: 0-->INDEPENDENT

## 2019-03-04 ASSESSMENT — ENCOUNTER SYMPTOMS
RHINORRHEA: 1
SHORTNESS OF BREATH: 1
FEVER: 0
CHILLS: 0
COUGH: 0

## 2019-03-04 ASSESSMENT — MIFFLIN-ST. JEOR
SCORE: 1638.71
SCORE: 1636.44

## 2019-03-04 NOTE — Clinical Note
The first balloon was inserted into the left anterior descending and middle left anterior descending.  Max pressure = 12 carito. Total duration = 30 seconds.     Max pressure = 15 carito. Total duration = 20 seconds.    Balloon reinflated a second time: Max pressure = 15 carito. Total duration = 20 seconds.  Balloon reinflated a third time: Max pressure = 20 carito. Total duration = 30 seconds.  Balloon reinflated a fourth time: Max pressure = 22 carito. Total duration = 30 seconds.

## 2019-03-04 NOTE — ED NOTES
.  [facility]  ED Nurse Handoff Report    Viviana Gilman is a 53 year old male   ED Chief complaint: Chest Pain  . ED Diagnosis:   Final diagnoses:   NSTEMI (non-ST elevated myocardial infarction) (H)     Allergies: No Known Allergies    Code Status: Full Code  Activity level - Baseline/Home:  Independent. Activity Level - Current:  Assist of one Lift room needed: No. Bariatric: No   Needed: No   Isolation: No. Infection: Not Applicable.     Vital Signs:   Vitals:    03/04/19 1330 03/04/19 1335 03/04/19 1340 03/04/19 1345   BP: (!) 157/97 (!) 136/92 133/89 130/86   Pulse: 60 58 58 63   Resp: 10 15 13 12   Temp:       TempSrc:       SpO2: 100% 99% 97% 98%   Weight:       Height:           Cardiac Rhythm:  ,      Pain level:    Patient confused: No. Patient Falls Risk: No.   Elimination Status: Has not voided   Patient Report - Initial Complaint: Chest Pain. Focused Assessment: Viviana Gilman is a 53 year old male with a history of coronary artery disease, Hypertension, hyperlipidemia, myocardial infarction, who presents with concern for chest pain. The patient reports that yesterday evening he was relaxing at home walking to the living room when he noted onset of left sided chest pain. This persisted through the night and into this morning , and after he dropped his kid off at school and went to work he noticed an exacerbation of the pain which began radiating down his left arm and to his back through his left shoulder. He reports shortness of breath as well. He states that this sensation is the exact same as his previous anterior STEMI 8 years ago, in 2011, for which he received a stent in the proximal LAD, and several weeks later had another stent placed to circumflex artery. He follows Dr. Fleming for cardiology. He states he is active at work and does not experience chest pain with this . He denies any recent strenuous activity. He denies leg pain, swelling, cough , fever, chills. He notes a slight  rhinorrhea over the last several days but denies all other concerns. (physician note)     Cardiac/PE/DVT Risk Factors:  The patient has a history of hypertension, hyperlipidemia, but not diabetes, or smoking. He reports no family history of heart disease. The patient denies any personal or familial history of PE, DVT, or clotting disorder. The patient reports no recent travel, surgery, or other immobilizations.       Tests Performed: Labs, Chest Xray. Abnormal Results: .  Labs Ordered and Resulted from Time of ED Arrival Up to the Time of Departure from the ED   TROPONIN I - Abnormal; Notable for the following components:       Result Value    Troponin I ES 0.154 (*)     All other components within normal limits   TROPONIN POCT - Abnormal; Notable for the following components:    Troponin I 0.10 (*)     All other components within normal limits   CBC WITH PLATELETS DIFFERENTIAL   BASIC METABOLIC PANEL   PERIPHERAL IV CATHETER   CARDIAC CONTINUOUS MONITORING   ISTAT TROPONIN NURSING POCT   MEASURE WEIGHT   NOTIFY PHYSICIAN   NOTIFY PHYSICIAN   PLATELETS MONITORED PER HEPARIN TREATMENT PROTOCOL (FOR MEANINGFUL USE   .  XR Chest 2 Views   Final Result   IMPRESSION: PA and lateral views of the chest. Lungs are clear. Heart   is normal in size. No effusions are evident. No pneumothorax.      LAYTON MCCLOUD MD      .   Treatments provided: Aspirin 324mg, nitroglycerin x3, Heparin    Family Comments: Not present  OBS brochure/video discussed/provided to patient:  N/A  ED Medications:   Medications   sodium chloride 0.9% infusion (not administered)   nitroGLYcerin (NITROSTAT) sublingual tablet 0.4 mg (0.4 mg Sublingual Given 3/4/19 1332)   heparin infusion 25,000 units in 0.45% NaCl 250 mL (900 Units/hr Intravenous New Bag 3/4/19 1348)   nitroGLYcerin (NITROSTAT) sublingual tablet 0.4 mg (0.4 mg Sublingual Given 3/4/19 1250)   aspirin (ASA) chewable tablet 324 mg (324 mg Oral Given 3/4/19 1250)   heparin Loading Dose bolus  dose from infusion pump 4,400 Units (4,400 Units Intravenous Given 3/4/19 1349)     Drips infusing:  Yes  For the majority of the shift, the patient's behavior Green. Interventions performed were Call Light, Frequent checks.     Severe Sepsis OR Septic Shock Diagnosis Present: No      ED Nurse Name/Phone Number: Kalina Chao,   2:17 PM    RECEIVING UNIT ED HANDOFF REVIEW    Above ED Nurse Handoff Report was reviewed: Yes  Reviewed by: India Reyna on March 4, 2019 at 2:28 PM

## 2019-03-04 NOTE — PLAN OF CARE
PRIMARY DIAGNOSIS:  Chest Pain  GOALS TO BE MET BEFORE DISCHARGE:  1. Ruled out acute coronary syndrome (negative or stable Troponin):  No. Admission Troponin Positive with Repeat draws tonight.  2. Pain Status: Improved-controlled with oral pain medications.  3. Appropriate provocative testing performed: No. Diagnostic test pending  - Stress Test Procedure: per provider Orders  - Interpretation of cardiac rhythm per telemetry tech: Sinus Rhythm    4. Cleared by Consultants (if applicable): No.  5. Return to near baseline physical activity: No. Intermittent pain/discomfort when ambulating.  Discharge Planner Nurse   Safe discharge environment identified: No  Barriers to discharge: Yes  Patient alert and orient x4. (+) CSM BUE/BLE. No respiratory or cardiac distress voiced or noted. Patient continues on Heparin drip with heparin 10a blood level monitoring.       Entered by: AGUILAR NEWMAN 03/04/2019      Please review provider order for any additional goals.   Nurse to notify provider when goals have been met and patient is ready for discharge.

## 2019-03-04 NOTE — CONSULTS
Cardiology consultation dictated    Assessment NSTEMI. Old history AWMI PCI LAD with COURTNEY and elective stent M1 2011 in setting of HTN and dyslipidemia.     Recommendation  1) Resume asa, statin, bb, ACE  2) agree with IVUFH  3) CAG, possible PCI 3/5  4) TTE    I have examined the patient, reviewed the history, medications and pre procedural tests. I have explained to the patient the risks of death, MI, stroke, hematoma, possible urgent bypass surgery for failed PCI, use of stents, thienopyridine agents, possible peripheral vascular complications, arrhythmia, the use of FFR in clinical decision-making and alternative of medical therapy alone in regards to left heart catheterization, left ventriculography, coronary angiography, and possible percutaneous coronary intervention. The patient voiced understanding and wishes to proceed. The patient has a good right radial pulse, normal ulnar pulse and a normal Jose Maria's sign.

## 2019-03-04 NOTE — ED PROVIDER NOTES
History     Chief Complaint:  Chest pain    HPI   Viviana Gilman is a 53 year old male with a history of coronary artery disease, Hypertension, hyperlipidemia, myocardial infarction, who presents with concern for chest pain. The patient reports that yesterday evening he was relaxing at home walking to the living room when he noted onset of left sided chest pain. This persisted through the night and into this morning , and after he dropped his kid off at school and went to work he noticed an exacerbation of the pain which began radiating down his left arm and to his back through his left shoulder. He reports shortness of breath as well. He states that this sensation is the exact same as his previous anterior STEMI 8 years ago, in 2011, for which he received a stent in the proximal LAD, and several weeks later had another stent placed to circumflex artery. He follows Dr. Fleming for cardiology. He states he is active at work and does not experience chest pain with this . He denies any recent strenuous activity. He denies leg pain, swelling, cough , fever, chills. He notes a slight rhinorrhea over the last several days but denies all other concerns.     Cardiac/PE/DVT Risk Factors:  The patient has a history of hypertension, hyperlipidemia, but not diabetes, or smoking. He reports no family history of heart disease. The patient denies any personal or familial history of PE, DVT, or clotting disorder. The patient reports no recent travel, surgery, or other immobilizations.     Allergies:  NKDA    Medications:    Aspirin  Lipitor  Lisinopril    Past Medical History:    CAD  HTN  Malaria  HLD  Old MI    Past Surgical History:    ACL repair    Family History:    DM type II  HTN  HLD    Social History:  Negative for tobacco use.  Positive for alcohol use.     Review of Systems   Constitutional: Negative for chills and fever.   HENT: Positive for rhinorrhea.    Respiratory: Positive for shortness of breath. Negative for cough.   "  Cardiovascular: Positive for chest pain. Negative for leg swelling.   All other systems reviewed and are negative.    Physical Exam     Patient Vitals for the past 24 hrs:   BP Temp Temp src Pulse Heart Rate Resp SpO2 Height Weight   03/04/19 1522 -- -- -- -- -- -- -- 1.753 m (5' 9\") --   03/04/19 1450 160/90 97.1  F (36.2  C) -- -- 58 16 98 % -- 79.6 kg (175 lb 8 oz)   03/04/19 1435 136/84 -- -- 51 51 11 97 % -- --   03/04/19 1430 131/86 -- -- 50 53 15 97 % -- --   03/04/19 1420 137/87 -- -- 52 53 15 100 % -- --   03/04/19 1405 (!) 142/96 -- -- 58 65 15 100 % -- --   03/04/19 1400 (!) 132/105 -- -- 62 61 10 98 % -- --   03/04/19 1355 135/83 -- -- 56 59 8 96 % -- --   03/04/19 1345 130/86 -- -- 63 63 12 98 % -- --   03/04/19 1340 133/89 -- -- 58 60 13 97 % -- --   03/04/19 1335 (!) 136/92 -- -- 58 58 15 99 % -- --   03/04/19 1330 (!) 157/97 -- -- 60 57 10 100 % -- --   03/04/19 1255 (!) 145/108 -- -- 68 70 23 98 % -- --   03/04/19 1230 (!) 199/108 -- -- 61 -- -- 100 % -- --   03/04/19 1224 (!) 169/116 97.8  F (36.6  C) Temporal 61 -- 18 96 % 1.753 m (5' 9\") 79.8 kg (176 lb)     Physical Exam  General: In no acute distress. Normal mood and affect.  Skin: Good turgor, no rash, no unusual bruising or prominent lesions.  HEENT: Head: Normocephalic, atraumatic, no visible masses.   Eyes: Conjunctiva clear.  Ears: EACs clear, TMs translucent.   Nose: No external lesions, mucosa non-inflamed, septum and turbinates normal.   Throat/pharynx: Mucous membranes moist, no mucosal lesions.   Neck: Supple, without lymphadenopathy.  Cardiac: Normal rate and regular rhythm, no murmur or gallop.  No reproducible chest discomfort.  Lungs: Clear to auscultation.  Abdomen: Abdomen soft, non-tender. No rebound tenderness of guarding.   Musculoskeletal: Normal gait and station. No calf tenderness or swelling.   Neurologic: Oriented x 3. GCS: 15.  Psychiatric: Intact recent and remote memory, judgment and insight, normal mood and affect. "     Emergency Department Course   ECG:    Indication: chest pain  Time: 1234  Vent. Rate 57 bpm. NM interval 154. QRS duration 90. QT/QTc 448/436. P-R-T axis 57 11 13.  Sinus bradycardia, otherwise normal ECG.. Read time: 1234    Imaging:  Radiographic findings were communicated with the patient who voiced understanding of the findings.    XR Chest 2 views:   PA and lateral views of the chest. Lungs are clear. Heart  is normal in size. No effusions are evident. No pneumothorax. As per radiology.     Laboratory:    CBC: WBC: 6.4, HGB: 15.2, PLT: 164  BMP:  All WNL (Creatinine: 0.94)    1245 Troponin: 0.1    Interventions:    1250 Nitroglycerine 0.4 mg Sublingual   Aspirin 324 mg   1309 Nitroglycerine 0.4 mg Sublingual   1332 Nitroglycerine 0.4 mg Sublingual  1348 Heparin infusion 900 units/hr IV  1349 Heparin loading dose 4400 units IV    Emergency Department Course:  Nursing notes and vitals reviewed. (1247) I performed an exam of the patient as documented above.     IV inserted. Medicine administered as documented above. Blood drawn. This was sent to the lab for further testing, results above.    The patient was sent for a CXR while in the emergency department, findings above.     (1330) I rechecked the patient and discussed the results of his workup thus far.     (1343)  I consulted with Vanessa Muñoz PA-C of the hospitalist services. They are in agreement to accept the patient for admission.    Findings and plan explained to the Patient who consents to admission. Discussed the patient with Dr. Johnson, who will admit the patient to a cardiac-tele bed for further monitoring, evaluation, and treatment.      Impression & Plan      Medical Decision Making:  Viviana Gilman is a 53 year old male who presented the ED today for evaluation of chest pain.  Details of the patient's history can be noted in the HPI.  Differential diagnosis included ACS, dissection, pneumothorax, pneumonia, PE, costochondritis, pericarditis, panic  attack, gastric reflux, amongst others.  Workup here in the ED indicated elevated troponin at 0.154.  The remaining laboratory analysis was without significant abnormalities.  Chest x-ray clear.  Bradycardia on ECG, no ischemic changes.  Patient symptoms likely consistent with NSTEMI.  Patient's heart score is 4.  Patient was low risk on Wells criteria.  It was not PERC negative due to his age, however, low concern for PE at this time is he is not hypoxic, no tachycardia, no other risk factors.  Patient seems consistent with his prior ACS.  With his elevated troponin, heparin was ordered.  He was also given 3 doses of sublingual nitroglycerin and a full dose of aspirin here.  Patient was pain-free at the time of his admission.  He is also vitally stable.  I did discuss the patient's case with Vanessa Muñoz PA-C who agreed to admit the patient in the hospital for Dr. Johnson.  Cardiology consult and likely cardiac catheterization.  Continuous cardiac monitoring, serial troponins.  All questions were answered prior to the patient's admission.  He was in agreement with the treatment plan as stated above.    Diagnosis:    ICD-10-CM    1. NSTEMI (non-ST elevated myocardial infarction) (H) I21.4 CBC with platelets     Troponin I   2. Chest pain, unspecified type R07.9        Disposition:  Admitted to cardiac-tele bed under care of Dr. Alex Castro Disclosure:  I, Amauri Tee, am serving as a scribe on 3/4/2019 at 12:48 PM to personally document services performed by Jasson Rojas PA-C based on my observations and the provider's statements to me.     Amauri Tee  3/4/2019   [facility] EMERGENCY DEPARTMENT    This was created at least in part with a voice recognition software. Mistakes/typos may be present.       Crystal Spaulding PA  03/04/19 173

## 2019-03-04 NOTE — Clinical Note
Multiple balloon inflation. The first balloon was inserted into the left anterior descending and middle left anterior descending.  Max pressure = 10 carito. Total duration = 30 seconds.     The second balloon was inserted into the Left Anterior Descending and left anterior decending diagonal. Max pressure = 8 carito. Total duration = 30 seconds.   Max pressure = 8 carito. Total duration = 30 seconds.

## 2019-03-04 NOTE — Clinical Note
The first balloon was inserted into the left anterior descending and middle left anterior descending.  Max pressure = 9 carito. Total duration = 30 seconds.

## 2019-03-04 NOTE — Clinical Note
The first balloon was inserted into the left anterior descending and middle left anterior descending.  Max pressure = 15 carito. Total duration = 15 seconds.

## 2019-03-04 NOTE — Clinical Note
The first balloon was inserted into the left anterior descending and proximal left anterior descending.  Max pressure = 14 carito. Total duration = 30 seconds.

## 2019-03-04 NOTE — Clinical Note
The first balloon was inserted into the left anterior descending and middle left anterior descending.  Max pressure = 14 carito. Total duration = 30 seconds.     Max pressure = 20 carito. Total duration = 30 seconds.    Balloon reinflated a second time: Max pressure = 20 carito. Total duration = 30 seconds.  Balloon reinflated a third time: Max pressure = 22 carito. Total duration = 30 seconds.  Balloon reinflated a fourth time: Max pressure = 22 carito. Total duration = 20 seconds.

## 2019-03-04 NOTE — PHARMACY-ADMISSION MEDICATION HISTORY
Admission medication history interview status for this patient is complete. See [facility] admission navigator for allergy information, prior to admission medications and immunization status.     Medication history interview source(s):Patient  Medication history resources (including written lists, pill bottles, clinic record):None    Changes made to PTA medication list:  Added: none  Deleted: none  Changed: none    Actions taken by pharmacist (provider contacted, etc):None     Additional medication history information:None    Medication reconciliation/reorder completed by provider prior to medication history? No    Do you take OTC medications (eg tylenol, ibuprofen, fish oil, eye/ear drops, etc)? N(Y/N)    For patients on insulin therapy: N (Y/N)        Prior to Admission medications    Medication Sig Last Dose Taking? Auth Provider   aspirin 81 MG EC tablet Take 81 mg by mouth every evening 3/3/2019 at Unknown time Yes Unknown, Entered By History   atorvastatin (LIPITOR) 40 MG tablet Take 1 tablet (40 mg) by mouth daily 3/3/2019 at pm Yes Amauri Fleming MD   lisinopril (PRINIVIL/ZESTRIL) 10 MG tablet Take 1 tablet (10 mg) by mouth daily 3/3/2019 at pm Yes Amauri Fleming MD

## 2019-03-04 NOTE — Clinical Note
The first balloon was inserted into the left anterior descending and left anterior descending diagonal.  Max pressure = 4 carito. Total duration = 10 seconds.     Max pressure = 4 carito. Total duration = 30 seconds.    Balloon reinflated a second time: Max pressure = 4 carito. Total duration = 30 seconds.  Balloon reinflated a third time: Max pressure = 10 carito. Total duration = 45 seconds.

## 2019-03-04 NOTE — Clinical Note
Stent deployed in the middle left anterior descending. Max pressure = 9 carito. Total duration = 30 seconds. Balloon reinflated a second time: Max pressure = 10 carito. Total duration = 15 seconds.

## 2019-03-04 NOTE — ED TRIAGE NOTES
ABCs intact. Pt c/o intermittent L sided CP radiating down L arm since yesterday. Pt states pain with movement.

## 2019-03-04 NOTE — PROGRESS NOTES
Patient presents to clinic as a walkin with chest pain:    CHEST PAIN     Onset: maybe a little bit yesterday, worse this morning when dropping child off at school                                   Description:   Location:  left side  Character: sharp  Radiation: down left arm - some tingling in the back of upper arm  Duration: intermittent - only when moving    Intensity: moderate to severe    Progression of Symptoms:  worsening and intermittent    Accompanying Signs & Symptoms:  Shortness of breath: no   Sweating: no   Nausea/vomiting: no   Lightheadedness: YES- sometimes, but unclear if this is related to pain  Palpitations: no  Fever/Chills: no   Cough: YES- maybe a little bit  Heartburn: no     History:   Family history of heart disease YES- personal history and father had heart disease - passed away at 84 years of age  Tobacco use: no    Precipitating factors:     Notices it more with movement  Worse with exertion: YES  Worse with deep breaths :  YES - maybe a little bit  Related to food: no    Alleviating factors:  Sitting down it goes away       Therapies Tried and outcome: None    Patient with significant cardiac history. Had MI about 8 years ago with stent placement.      Message handled by Nurse Triage with [facility] - provider name: Tyrese Caldera DO. Given patient's history, would recommend proceeding to ER for further evaluation. OK to transport in personal vehicle as patient is otherwise stable with exception of elevated BP.     Patient verbalized understanding and agrees with plan.    REYNA FinchN, RN

## 2019-03-04 NOTE — PLAN OF CARE
Patient transferred at approximately 1445. Patient settled into room. VSS. Denies pain at this time. Heparin stopped.

## 2019-03-05 ENCOUNTER — APPOINTMENT (OUTPATIENT)
Dept: CARDIOLOGY | Facility: CLINIC | Age: 54
DRG: 247 | End: 2019-03-05
Attending: INTERNAL MEDICINE
Payer: COMMERCIAL

## 2019-03-05 ENCOUNTER — ANESTHESIA EVENT (OUTPATIENT)
Dept: CARDIOLOGY | Facility: CLINIC | Age: 54
End: 2019-03-05

## 2019-03-05 ENCOUNTER — ANESTHESIA (OUTPATIENT)
Dept: CARDIOLOGY | Facility: CLINIC | Age: 54
End: 2019-03-05

## 2019-03-05 LAB
CHOLEST SERPL-MCNC: 186 MG/DL
ERYTHROCYTE [DISTWIDTH] IN BLOOD BY AUTOMATED COUNT: 14.2 % (ref 10–15)
GLUCOSE BLDC GLUCOMTR-MCNC: 110 MG/DL (ref 70–99)
HCT VFR BLD AUTO: 44.6 % (ref 40–53)
HDLC SERPL-MCNC: 47 MG/DL
HGB BLD-MCNC: 14.3 G/DL (ref 13.3–17.7)
KCT BLD-ACNC: 241 SEC (ref 75–150)
KCT BLD-ACNC: 261 SEC (ref 75–150)
KCT BLD-ACNC: 477 SEC (ref 75–150)
LDLC SERPL CALC-MCNC: 117 MG/DL
LMWH PPP CHRO-ACNC: 0.22 IU/ML
LMWH PPP CHRO-ACNC: 0.22 IU/ML
MCH RBC QN AUTO: 26.7 PG (ref 26.5–33)
MCHC RBC AUTO-ENTMCNC: 32.1 G/DL (ref 31.5–36.5)
MCV RBC AUTO: 83 FL (ref 78–100)
NONHDLC SERPL-MCNC: 139 MG/DL
PLATELET # BLD AUTO: 157 10E9/L (ref 150–450)
RBC # BLD AUTO: 5.36 10E12/L (ref 4.4–5.9)
TRIGL SERPL-MCNC: 110 MG/DL
WBC # BLD AUTO: 6.3 10E9/L (ref 4–11)

## 2019-03-05 PROCEDURE — 99152 MOD SED SAME PHYS/QHP 5/>YRS: CPT | Performed by: INTERNAL MEDICINE

## 2019-03-05 PROCEDURE — C9600 PERC DRUG-EL COR STENT SING: HCPCS | Mod: LD | Performed by: INTERNAL MEDICINE

## 2019-03-05 PROCEDURE — 0270346 DILATION OF CORONARY ARTERY, ONE ARTERY, BIFURCATION, WITH DRUG-ELUTING INTRALUMINAL DEVICE, PERCUTANEOUS APPROACH: ICD-10-PCS | Performed by: INTERNAL MEDICINE

## 2019-03-05 PROCEDURE — 87640 STAPH A DNA AMP PROBE: CPT | Performed by: INTERNAL MEDICINE

## 2019-03-05 PROCEDURE — 80061 LIPID PANEL: CPT | Performed by: INTERNAL MEDICINE

## 2019-03-05 PROCEDURE — 25000132 ZZH RX MED GY IP 250 OP 250 PS 637: Performed by: INTERNAL MEDICINE

## 2019-03-05 PROCEDURE — 02703ZZ DILATION OF CORONARY ARTERY, ONE ARTERY, PERCUTANEOUS APPROACH: ICD-10-PCS | Performed by: INTERNAL MEDICINE

## 2019-03-05 PROCEDURE — C1874 STENT, COATED/COV W/DEL SYS: HCPCS | Performed by: INTERNAL MEDICINE

## 2019-03-05 PROCEDURE — 25800030 ZZH RX IP 258 OP 636: Performed by: INTERNAL MEDICINE

## 2019-03-05 PROCEDURE — 92920 PRQ TRLUML C ANGIOP 1ART&/BR: CPT | Mod: LD | Performed by: INTERNAL MEDICINE

## 2019-03-05 PROCEDURE — 36415 COLL VENOUS BLD VENIPUNCTURE: CPT | Performed by: INTERNAL MEDICINE

## 2019-03-05 PROCEDURE — 00000146 ZZHCL STATISTIC GLUCOSE BY METER IP

## 2019-03-05 PROCEDURE — 25000132 ZZH RX MED GY IP 250 OP 250 PS 637: Performed by: HOSPITALIST

## 2019-03-05 PROCEDURE — 93010 ELECTROCARDIOGRAM REPORT: CPT | Performed by: INTERNAL MEDICINE

## 2019-03-05 PROCEDURE — 93454 CORONARY ARTERY ANGIO S&I: CPT | Mod: 26 | Performed by: INTERNAL MEDICINE

## 2019-03-05 PROCEDURE — C1760 CLOSURE DEV, VASC: HCPCS | Performed by: INTERNAL MEDICINE

## 2019-03-05 PROCEDURE — 99232 SBSQ HOSP IP/OBS MODERATE 35: CPT | Performed by: INTERNAL MEDICINE

## 2019-03-05 PROCEDURE — 25000125 ZZHC RX 250: Performed by: INTERNAL MEDICINE

## 2019-03-05 PROCEDURE — 93454 CORONARY ARTERY ANGIO S&I: CPT | Performed by: INTERNAL MEDICINE

## 2019-03-05 PROCEDURE — 25500064 ZZH RX 255 OP 636: Performed by: HOSPITALIST

## 2019-03-05 PROCEDURE — 93005 ELECTROCARDIOGRAM TRACING: CPT

## 2019-03-05 PROCEDURE — 85347 COAGULATION TIME ACTIVATED: CPT

## 2019-03-05 PROCEDURE — 87641 MR-STAPH DNA AMP PROBE: CPT | Performed by: INTERNAL MEDICINE

## 2019-03-05 PROCEDURE — 25000128 H RX IP 250 OP 636: Performed by: PHYSICIAN ASSISTANT

## 2019-03-05 PROCEDURE — 92921 ZZHC PRQ TRLUML CORONARY ANGIOPLASTY ADDL BRANCH: CPT | Mod: LD | Performed by: INTERNAL MEDICINE

## 2019-03-05 PROCEDURE — 36415 COLL VENOUS BLD VENIPUNCTURE: CPT | Performed by: HOSPITALIST

## 2019-03-05 PROCEDURE — 40000264 ECHOCARDIOGRAM COMPLETE

## 2019-03-05 PROCEDURE — 25000128 H RX IP 250 OP 636: Performed by: HOSPITALIST

## 2019-03-05 PROCEDURE — 99153 MOD SED SAME PHYS/QHP EA: CPT | Performed by: INTERNAL MEDICINE

## 2019-03-05 PROCEDURE — B2111ZZ FLUOROSCOPY OF MULTIPLE CORONARY ARTERIES USING LOW OSMOLAR CONTRAST: ICD-10-PCS | Performed by: INTERNAL MEDICINE

## 2019-03-05 PROCEDURE — 92928 PRQ TCAT PLMT NTRAC ST 1 LES: CPT | Mod: LD | Performed by: INTERNAL MEDICINE

## 2019-03-05 PROCEDURE — 20000003 ZZH R&B ICU

## 2019-03-05 PROCEDURE — 25000128 H RX IP 250 OP 636: Performed by: INTERNAL MEDICINE

## 2019-03-05 PROCEDURE — 85520 HEPARIN ASSAY: CPT | Performed by: PHYSICIAN ASSISTANT

## 2019-03-05 PROCEDURE — 27210802 ZZH SHEATH CR1: Performed by: INTERNAL MEDICINE

## 2019-03-05 PROCEDURE — 99207 ZZC CDG-MDM COMPONENT: MEETS LOW - DOWN CODED: CPT | Performed by: INTERNAL MEDICINE

## 2019-03-05 PROCEDURE — 93306 TTE W/DOPPLER COMPLETE: CPT | Mod: 26 | Performed by: INTERNAL MEDICINE

## 2019-03-05 PROCEDURE — 36415 COLL VENOUS BLD VENIPUNCTURE: CPT | Performed by: PHYSICIAN ASSISTANT

## 2019-03-05 PROCEDURE — C1887 CATHETER, GUIDING: HCPCS | Performed by: INTERNAL MEDICINE

## 2019-03-05 PROCEDURE — C1769 GUIDE WIRE: HCPCS | Performed by: INTERNAL MEDICINE

## 2019-03-05 PROCEDURE — C1725 CATH, TRANSLUMIN NON-LASER: HCPCS | Performed by: INTERNAL MEDICINE

## 2019-03-05 PROCEDURE — 85027 COMPLETE CBC AUTOMATED: CPT | Performed by: HOSPITALIST

## 2019-03-05 PROCEDURE — 85520 HEPARIN ASSAY: CPT | Performed by: HOSPITALIST

## 2019-03-05 PROCEDURE — 99232 SBSQ HOSP IP/OBS MODERATE 35: CPT | Mod: 25 | Performed by: INTERNAL MEDICINE

## 2019-03-05 PROCEDURE — 27210794 ZZH OR GENERAL SUPPLY STERILE: Performed by: INTERNAL MEDICINE

## 2019-03-05 PROCEDURE — 25000132 ZZH RX MED GY IP 250 OP 250 PS 637: Performed by: PHYSICIAN ASSISTANT

## 2019-03-05 DEVICE — STENT SYNERGY DRUG ELUTING 3.00X38MM  H7493926038300: Type: IMPLANTABLE DEVICE | Status: FUNCTIONAL

## 2019-03-05 RX ORDER — NITROGLYCERIN 0.4 MG/1
0.4 TABLET SUBLINGUAL EVERY 5 MIN PRN
Status: DISCONTINUED | OUTPATIENT
Start: 2019-03-05 | End: 2019-03-07 | Stop reason: HOSPADM

## 2019-03-05 RX ORDER — HEPARIN SODIUM 1000 [USP'U]/ML
INJECTION, SOLUTION INTRAVENOUS; SUBCUTANEOUS
Status: DISCONTINUED
Start: 2019-03-05 | End: 2019-03-05 | Stop reason: HOSPADM

## 2019-03-05 RX ORDER — FENTANYL CITRATE 50 UG/ML
INJECTION, SOLUTION INTRAMUSCULAR; INTRAVENOUS
Status: DISCONTINUED
Start: 2019-03-05 | End: 2019-03-05 | Stop reason: HOSPADM

## 2019-03-05 RX ORDER — FENTANYL CITRATE 50 UG/ML
25-50 INJECTION, SOLUTION INTRAMUSCULAR; INTRAVENOUS
Status: ACTIVE | OUTPATIENT
Start: 2019-03-05 | End: 2019-03-06

## 2019-03-05 RX ORDER — CALCIUM CARBONATE 500 MG/1
1000 TABLET, CHEWABLE ORAL
Status: DISCONTINUED | OUTPATIENT
Start: 2019-03-05 | End: 2019-03-07 | Stop reason: HOSPADM

## 2019-03-05 RX ORDER — ATROPINE SULFATE 0.1 MG/ML
0.5 INJECTION INTRAVENOUS EVERY 5 MIN PRN
Status: ACTIVE | OUTPATIENT
Start: 2019-03-05 | End: 2019-03-06

## 2019-03-05 RX ORDER — LORAZEPAM 2 MG/ML
0.5 INJECTION INTRAMUSCULAR
Status: DISCONTINUED | OUTPATIENT
Start: 2019-03-05 | End: 2019-03-05 | Stop reason: HOSPADM

## 2019-03-05 RX ORDER — LIDOCAINE HYDROCHLORIDE 10 MG/ML
INJECTION, SOLUTION EPIDURAL; INFILTRATION; INTRACAUDAL; PERINEURAL
Status: DISCONTINUED
Start: 2019-03-05 | End: 2019-03-05 | Stop reason: HOSPADM

## 2019-03-05 RX ORDER — NITROGLYCERIN 5 MG/ML
VIAL (ML) INTRAVENOUS
Status: DISCONTINUED
Start: 2019-03-05 | End: 2019-03-05 | Stop reason: HOSPADM

## 2019-03-05 RX ORDER — POTASSIUM CHLORIDE 1500 MG/1
20 TABLET, EXTENDED RELEASE ORAL
Status: COMPLETED | OUTPATIENT
Start: 2019-03-05 | End: 2019-03-05

## 2019-03-05 RX ORDER — NITROGLYCERIN 20 MG/100ML
INJECTION INTRAVENOUS
Status: DISCONTINUED
Start: 2019-03-05 | End: 2019-03-05 | Stop reason: HOSPADM

## 2019-03-05 RX ORDER — IOPAMIDOL 755 MG/ML
INJECTION, SOLUTION INTRAVASCULAR
Status: DISCONTINUED | OUTPATIENT
Start: 2019-03-05 | End: 2019-03-05 | Stop reason: HOSPADM

## 2019-03-05 RX ORDER — FLUMAZENIL 0.1 MG/ML
0.2 INJECTION, SOLUTION INTRAVENOUS
Status: ACTIVE | OUTPATIENT
Start: 2019-03-05 | End: 2019-03-06

## 2019-03-05 RX ORDER — NALOXONE HYDROCHLORIDE 0.4 MG/ML
.1-.4 INJECTION, SOLUTION INTRAMUSCULAR; INTRAVENOUS; SUBCUTANEOUS
Status: DISCONTINUED | OUTPATIENT
Start: 2019-03-05 | End: 2019-03-07 | Stop reason: HOSPADM

## 2019-03-05 RX ORDER — HYDROCODONE BITARTRATE AND ACETAMINOPHEN 5; 325 MG/1; MG/1
1-2 TABLET ORAL EVERY 4 HOURS PRN
Status: DISCONTINUED | OUTPATIENT
Start: 2019-03-05 | End: 2019-03-07 | Stop reason: HOSPADM

## 2019-03-05 RX ORDER — LIDOCAINE 40 MG/G
CREAM TOPICAL
Status: DISCONTINUED | OUTPATIENT
Start: 2019-03-05 | End: 2019-03-05 | Stop reason: HOSPADM

## 2019-03-05 RX ORDER — HEPARIN SODIUM 1000 [USP'U]/ML
INJECTION, SOLUTION INTRAVENOUS; SUBCUTANEOUS
Status: DISCONTINUED | OUTPATIENT
Start: 2019-03-05 | End: 2019-03-05 | Stop reason: HOSPADM

## 2019-03-05 RX ORDER — ACETAMINOPHEN 325 MG/1
325-650 TABLET ORAL EVERY 4 HOURS PRN
Status: DISCONTINUED | OUTPATIENT
Start: 2019-03-05 | End: 2019-03-07 | Stop reason: HOSPADM

## 2019-03-05 RX ORDER — SODIUM CHLORIDE 9 MG/ML
INJECTION, SOLUTION INTRAVENOUS CONTINUOUS
Status: DISCONTINUED | OUTPATIENT
Start: 2019-03-05 | End: 2019-03-05 | Stop reason: HOSPADM

## 2019-03-05 RX ORDER — LORAZEPAM 0.5 MG/1
0.5 TABLET ORAL
Status: DISCONTINUED | OUTPATIENT
Start: 2019-03-05 | End: 2019-03-05 | Stop reason: HOSPADM

## 2019-03-05 RX ORDER — NALOXONE HYDROCHLORIDE 0.4 MG/ML
.2-.4 INJECTION, SOLUTION INTRAMUSCULAR; INTRAVENOUS; SUBCUTANEOUS
Status: ACTIVE | OUTPATIENT
Start: 2019-03-05 | End: 2019-03-06

## 2019-03-05 RX ORDER — FENTANYL CITRATE 50 UG/ML
INJECTION, SOLUTION INTRAMUSCULAR; INTRAVENOUS
Status: DISCONTINUED | OUTPATIENT
Start: 2019-03-05 | End: 2019-03-05 | Stop reason: HOSPADM

## 2019-03-05 RX ORDER — NITROGLYCERIN 5 MG/ML
VIAL (ML) INTRAVENOUS
Status: DISCONTINUED | OUTPATIENT
Start: 2019-03-05 | End: 2019-03-05 | Stop reason: HOSPADM

## 2019-03-05 RX ORDER — ASPIRIN 81 MG/1
81 TABLET ORAL DAILY
Status: DISCONTINUED | OUTPATIENT
Start: 2019-03-06 | End: 2019-03-07 | Stop reason: HOSPADM

## 2019-03-05 RX ORDER — SODIUM CHLORIDE 9 MG/ML
INJECTION, SOLUTION INTRAVENOUS CONTINUOUS
Status: ACTIVE | OUTPATIENT
Start: 2019-03-05 | End: 2019-03-06

## 2019-03-05 RX ADMIN — SODIUM CHLORIDE 150 ML/HR: 9 INJECTION, SOLUTION INTRAVENOUS at 11:14

## 2019-03-05 RX ADMIN — CARVEDILOL 3.12 MG: 3.12 TABLET, FILM COATED ORAL at 08:03

## 2019-03-05 RX ADMIN — NITROGLYCERIN 0.4 MG: 0.4 TABLET SUBLINGUAL at 16:00

## 2019-03-05 RX ADMIN — NITROGLYCERIN 0.07 MCG/KG/MIN: 5 INJECTION, SOLUTION INTRAVENOUS at 18:30

## 2019-03-05 RX ADMIN — POTASSIUM CHLORIDE 20 MEQ: 1500 TABLET, EXTENDED RELEASE ORAL at 10:35

## 2019-03-05 RX ADMIN — SODIUM CHLORIDE: 9 INJECTION, SOLUTION INTRAVENOUS at 22:32

## 2019-03-05 RX ADMIN — ATORVASTATIN CALCIUM 80 MG: 40 TABLET, FILM COATED ORAL at 20:46

## 2019-03-05 RX ADMIN — LISINOPRIL 10 MG: 10 TABLET ORAL at 20:45

## 2019-03-05 RX ADMIN — HUMAN ALBUMIN MICROSPHERES AND PERFLUTREN 2 ML: 10; .22 INJECTION, SOLUTION INTRAVENOUS at 10:21

## 2019-03-05 RX ADMIN — ASPIRIN 325 MG: 325 TABLET, DELAYED RELEASE ORAL at 10:35

## 2019-03-05 RX ADMIN — ASPIRIN 81 MG 81 MG: 81 TABLET ORAL at 08:03

## 2019-03-05 RX ADMIN — SODIUM CHLORIDE: 9 INJECTION, SOLUTION INTRAVENOUS at 15:42

## 2019-03-05 RX ADMIN — HEPARIN SODIUM 700 UNITS/HR: 10000 INJECTION, SOLUTION INTRAVENOUS at 15:44

## 2019-03-05 ASSESSMENT — ACTIVITIES OF DAILY LIVING (ADL)
ADLS_ACUITY_SCORE: 11

## 2019-03-05 ASSESSMENT — MIFFLIN-ST. JEOR: SCORE: 1626.92

## 2019-03-05 NOTE — PLAN OF CARE
CR: Orders received, chart reviewed. Pt with plan for ECHO and cardiac cath today. Will reschedule CR for 3/6 post intervention.

## 2019-03-05 NOTE — CONSULTS
Consult Date:  03/04/2019      CARDIOLOGY CONSULTATION      PRIMARY CARE PHYSICIAN:  KELLEE Pretty      HISTORY OF PRESENT ILLNESS:  Viviana Gilman, a 53-year-old man with coronary artery disease, hypertension and dyslipidemia, was evaluated in consultation at the request of Dr. Johnson for non-ST segment elevation MI.      Last evening, the patient noted the onset of substernal chest discomfort radiating to the left arm occurring with  exertion, improved by rest.  The discomfort continued to bother him over the next subsequent hours with less and less activity, and he presented to our emergency room.  His ECG showed no acute changes, but troponin levels were mildly positive.  The patient was started on intravenous heparin, continued on aspirin and atorvastatin, and cardiology consultation was requested.      He last saw his usual cardiologist, Dr. Fleming, in 06/2018 and at that time was asymptomatic.  Currently, the patient is free of symptoms at rest.      Bradycardia has not permitted the addition of a beta blocker.      PAST MEDICAL HISTORY:   1.  Hypertension.   2.  Dyslipidemia.   3.  Coronary artery disease:   a.  Anterior wall ST segment elevation 09/28/2011.  Dominant right coronary with no significant narrowing.  Left main short with separate ostia for LAD and circumflex.  LAD with thrombotic 90% narrowing successfully treated with aspiration thrombectomy and implantation of 3.0 x 23 mm length everolimus eluting  stent.  80% narrowing noted in obtuse marginal branch.   b.  Status post staged intervention in circumflex with placement of a 2.75 x 12 mm length everolimus eluting  stent in the first marginal branch.  LAD stent site remains widely patent.  Ejection fraction 55%.   4.  Distant history of malaria.   5.  Status post left knee arthroscopic repair of ACL.      ALLERGIES:  None known.      HABITS:  The patient uses occasional alcohol.  Does not smoke cigarettes.      FAMILY HISTORY:  There is  history of diabetes and hypertension and dyslipidemia in his mother, peptic ulcer disease in his father.      MEDICATIONS:   1.  Aspirin 81 daily.   2.  Atorvastatin 40 mg daily.   3.  Lisinopril 10 mg daily.        The patient has not been on a beta blocker, presumptively due to bradycardia.      REVIEW OF SYSTEMS:  A 12-point review of systems was performed.  Outside the issues mentioned in the HPI, there are no complaints.      PHYSICAL EXAMINATION:   GENERAL:  Demonstrates a very friendly, pleasant and cooperative 53-year-old man.   VITAL SIGNS:  His blood pressure was 160/90, came down to 130/86, his heart rate was 52.   LUNGS:  Clear to percussion and auscultation.   CARDIOVASCULAR:  Shows a normal S1 with a normal S2, there is a soft S4.  There is no S3.  There is no murmur, rub or click.  His pulses are symmetrical in the carotid, radial, brachial, femoral, popliteal, dorsalis pedis, and post-tibials.   ABDOMEN:  Bowel sounds are present in all 4 quadrants.  Liver percusses to 7 cm, was not palpable.  Aorta is not tender.   EXTREMITIES:  There is no swelling, cyanosis or clubbing.   NEUROLOGIC:  Cranial nerves II-XII are intact.  Strength equal and symmetrical.   PSYCHIATRIC:  He displays normal insight and judgment.      LABORATORY DATA:  ECG shows a sinus bradycardia, rate 57, with normal axis and normal intervals.  There is somewhat early R-wave transition.  There are no acute changes.  Troponin level 0.154 and 0.163.  His potassium is 3.8.  His creatinine 0.94.  His most recent LDL cholesterol was 130.      A chest x-ray shows normal heart sounds and clear lung fields.      ASSESSMENT:  This 53-year-old man with a known history of coronary disease, status post old anterior wall infarction and subsequent stent implantation in both the LAD and obtuse marginal branch in 2011, presents with unstable angina culminating in a non-ST segment elevation MI.  Given the patient's admission blood pressure and his  lipid profile.  I am uncertain how compliant he has been with medical therapy.        Bradycardia does not permit the addition of a beta blocker.      I agree with intravenous heparin, aspirin and resuming lisinopril.   I would increase atorvastatin from 40 to 80 mg daily.  I would recommend an echocardiogram to assess left ventricular function and coronary angiography to determine whether the patient would benefit from mechanical revascularization.      RECOMMENDATIONS:   1.  Transthoracic echo.   2.  Agree with aspirin, heparin.   3.  Increase atorvastatin to 80 mg daily.   4.  Continue lisinopril.  Increase as needed to keep systolic pressure in the range of 130.   5.  Diagnostic coronary angiography with possible revascularization.      We have appreciated the opportunity to be involved in the care for your patient.  I discussed the risks of death, myocardial infarction, stroke, hematoma, bleeding, infection, embolus, arrhythmia, use of stents, restenosis, option of medical therapy alone.  I reviewed the importance of compliance with dual antiplatelet therapy should a stent be placed.      We appreciate the opportunity to be involved in the care of your patient, Mr. Viviana Gilman.         TACOS ELMORE MD             D: 2019   T: 2019   MT: WT      Name:     VIVIANA GILMAN   MRN:      [id number]        Account:       [id number]   :      1965           Consult Date:  2019      Document: [id number]       cc: Dorys GOODSON

## 2019-03-05 NOTE — PLAN OF CARE
Orientation: Alert and oriented x4  VSS. 93% on 2L O2.   Tele: SB. HR 57. Pt continues to have intermittent Lt sided chest pain that radiates to Lt arm and back, worse with activity. Chest pain 5/10, Tylenol given with improvement of pain to 1/10.  LS: Clear, denies SOB/+GUALLPA  GI: BS audible/active x4, NPO. Passing gas. No BM this shift. Denies N/V.   : Adequate urine output. Yes  Skin: Intact  Activity: SBA. Pt resting comfortably between cares.  Pain: 5/10 PRN Tylenol given  Plan: Tele, pain control, supportive cares, heparin gtt, Echocardiogram and Angiogram scheduled for today, discharge TBD. Continue with current cares.

## 2019-03-05 NOTE — PROGRESS NOTES
Patient voiced complaints of mid chest pain with radiating pain to LUE. Complaints of pressure in chest with 6/10 pain. hospitalist web-paged & made aware of patient's complaints

## 2019-03-05 NOTE — PROGRESS NOTES
[facility]  Cardiology Progress Note    Outpatient cardiologist: Dr. Fleming    Date of Service (when I saw the patient): 03/05/2019    Summary: Viviana Gilman is a 53 year old male with history of CAD, HTN, dyslipidemia, who was admitted on 3/4/2019 with NSTEMI.  3/3/19 PM he had onset of substernal chest discomfort radiating to the left arm with exertion, improved by rest.  The discomfort continued to bother him over the next subsequent hours, and he presented to the emergency room.  ECG showed no acute changes, but troponin levels were mildly positive.  The patient was started on intravenous heparin, continued on aspirin and atorvastatin, and cardiology consultation was requested.   Also Coreg started.        Interval History   Tele:  SB, SR    Had some chest pain earlier. Now ok.  No chest pain if he is not exertional.          Assessment & Plan   NSTEMI  H/o CAD  - Anterior wall ST segment elevation 09/28/2011.    Dominant right coronary with no significant narrowing.  Left main short with separate ostia for LAD and circumflex.  LAD with thrombotic 90% narrowing.   80% narrowing noted in obtuse marginal branch.   S/p aspiration thrombectomy COURTNEY to LAD  S/p staged intervention with COURTNEY to OM   - Now with NSTEMI  - ECG without ischemic changes  - Trop 0.154, 0.163, 0.109  - IV heparin, ASA, lisinopril, atorvastatin (increased to 80 mg).  Coreg 3.125 mg BID started.   - Echo today  - Cardiac cath, possible intervention today  * R and B discussed with patient by Dr. Muñoz  * Consent signed  * All questions answered      HTN  - BP remains elevated, though improved on lisinopril 10 mg (home med) and Coreg 3.125 mg BID  - Would titrate lisinopril as needed  - Follow      Dyslipidemia  - PTA on atorvastatin 40 mg  - FLP 2/8/17: , HDL 43, ,   - Atorvastatin has been increased to 80 mg  - Will need FLP in about 2-3 months         Heidi Chester PA-C    Cardiology staff  I have personally  examined the patient and reviewed his interim history and laboratory studies. He has angina with minimal activity.  His lungs are clear. His heart tones regular. He understands the procedure, its risk and that a femoral approach will be used for diagnostic and interventional study.       Patient Active Problem List   Diagnosis     Coronary artery disease involving native coronary artery without angina pectoris     Mixed hyperlipidemia     History of coronary angiogram     Hypertension goal BP (blood pressure) < 140/90     Old myocardial infarction     ACS (acute coronary syndrome) (H)       Physical Exam   Temp: 97.5  F (36.4  C) Temp src: Oral BP: (!) 131/94 Pulse: 58 Heart Rate: 68 Resp: 18 SpO2: 98 % O2 Device: None (Room air) Oxygen Delivery: 2 LPM  Vitals:    03/04/19 1224 03/04/19 1450 03/05/19 0555   Weight: 79.8 kg (176 lb) 79.6 kg (175 lb 8 oz) 78.7 kg (173 lb 6.4 oz)     Vital Signs with Ranges  Temp:  [96.6  F (35.9  C)-97.8  F (36.6  C)] 97.5  F (36.4  C)  Pulse:  [50-68] 58  Heart Rate:  [51-70] 68  Resp:  [8-23] 18  BP: (121-199)/() 131/94  SpO2:  [93 %-100 %] 98 %  I/O last 3 completed shifts:  In: 240 [P.O.:240]  Out: -     Constitutional: NAD.   Respiratory: CTAB.   Cardiovascular: RRR, s1s2, no sig murmur  GI: soft, BS+  Skin: warm, no rashes  Musculoskeletal: Moving all extremities  Neurologic: Alert, oriented x 3  Neuropsychiatric: Normal affect       Data   Recent Labs   Lab 03/05/19  0407 03/04/19 2005 03/04/19  1525 03/04/19  1245 03/04/19  1242   WBC 6.3  --  6.7  --  6.4   HGB 14.3  --  14.7  --  15.2   MCV 83  --  85  --  84     --  165  --  164   NA  --   --   --   --  138   POTASSIUM  --   --   --   --  3.8   CHLORIDE  --   --   --   --  104   CO2  --   --   --   --  28   BUN  --   --   --   --  16   CR  --   --   --   --  0.94   ANIONGAP  --   --   --   --  6   JAY  --   --   --   --  9.4   GLC  --   --   --   --  91   TROPI  --  0.109* 0.163*  --  0.154*   TROPONIN  --    --   --  0.10*  --      Recent Results (from the past 24 hour(s))   XR Chest 2 Views    Narrative    CHEST TWO VIEWS March 4, 2019 1:14 PM     HISTORY: Chest pain.    COMPARISON: Chest x-ray 9/28/2011.      Impression    IMPRESSION: PA and lateral views of the chest. Lungs are clear. Heart  is normal in size. No effusions are evident. No pneumothorax.    LAYTON MCCLOUD MD       Medications     Continuing ACE inhibitor/ARB/ARNI from home medication list OR ACE inhibitor/ARB order already placed during this visit       - MEDICATION INSTRUCTIONS -       HEParin 700 Units/hr (03/05/19 0753)     - MEDICATION INSTRUCTIONS -       - MEDICATION INSTRUCTIONS -       BETA BLOCKER NOT PRESCRIBED         aspirin  81 mg Oral Daily     atorvastatin  80 mg Oral Daily     carvedilol  3.125 mg Oral BID w/meals     lisinopril  10 mg Oral Daily     sodium chloride (PF)  3 mL Intracatheter Q8H

## 2019-03-05 NOTE — PLAN OF CARE
Notes very small amt of chest pain at rest .. Pain increases with activity .   Instructed to rest in bed as much as possible.   Awaits heart cath around 1 pm  Tele SR

## 2019-03-05 NOTE — PROGRESS NOTES
[facility]  Progress note, 03/05/19  Kendrick Kimball MD      Assessment & Plan   Viviana Gilman is a 53 year old male with a PMH of premature CAD (NSTEMI 2011 s/p PCI w/ stenting to LAD and 1st obtuse marginal) admitted on 3/4/2019 with exertional chest pain and shortness of breath similar to his previous NSTEMI, found in ER work up to have a nonischemic ECG but elevated troponin of 0.154 consistent with NSTEMI.  He was admitted for heparin drip and seen by cardiology with plans for coronary angiogram on 3/5.    1. NSTEMI: Troponin peaked at 0.163.  Previous history of NSTEMI in 2011, presenting symptoms are similar to previous NSTEMI.  reports medical compliance and denies any tobacco use. He is currently comfortable at rest with no residual chest pain after receiving sublingual nitroglycerin x 3 and 324 mg aspirin and some recurrence of symptoms when ambulating after admission.   -Telemetry monitoring  -Coronary angiogram today  -Prn O2, nitroglycerin, and morphine available  -Continue heparin gtt w/ pharmacy to dose  -Continue lisinopril, atorvastatin, Coreg, aspirin.  Numerous dose adjustments/changes in agents from home meds.  -He was previously on metoprolol succinate 25 mg daily but this was discontinued due to fatigue per cardiology notes  -TTE  -Cardiology consult appreciated    2. Hypertension: Urgently hypertensive on arrival, but BPs came down to 130s/80s after receiving sublingual nitroglycerin in the ER.  -Resume PTA lisinopril but increase to 10 mg daily from 5.  -Added carvedilol 3.125 mg BID    3. Hyperlipidemia: Previous lipid panel 2/2017 showed T chol 212, HDL 43, , and TGs 196.  -Now on 80 mg atorvastatin.     DVT Prophylaxis: On heparin gtt  Moore Catheter: not present  Code Status: FULL CODE  Disposition: Likely home in a day or 2 pending Findings.        Interval History (Subjective):      Patient admitted yesterday with non-ST elevation MI, I assumed care  "today  Plan for angiogram this afternoon  Patient still describes some chest symptoms when up ambulating but none at rest  Blood pressure control improved                  Physical Exam:      Last Vital Signs:  /82 (BP Location: Right arm)   Pulse 58   Temp 98.1  F (36.7  C) (Oral)   Resp 16   Ht 1.753 m (5' 9\")   Wt 78.7 kg (173 lb 6.4 oz)   SpO2 96%   BMI 25.61 kg/m        Intake/Output Summary (Last 24 hours) at 3/5/2019 1131  Last data filed at 3/4/2019 1900  Gross per 24 hour   Intake 240 ml   Output --   Net 240 ml       General: Alert, awake, no acute distress.  HEENT: NC/AT, eyes anicteric, external occular movements intact, face symmetric.  Dentition WNL, MM moist.  Cardiac: RRR, S1, S2.  No murmurs appreciated.  Pulmonary: Normal chest rise, normal work of breathing.  Lungs CTA BL  Abdomen: soft, non-tender, non-distended.  Bowel Sounds Present.  No guarding.  Extremities: no deformities.  Warm, well perfused.  Skin: no rashes or lesions noted.  Warm and Dry.  Neuro: No focal deficits noted.  Speech clear.  Coordination and strength grossly normal.  Psych: Appropriate affect.         Medications:      All current medications were reviewed with changes reflected in problem list.         Data:      All new lab and imaging data was reviewed.   Labs:  Recent Labs   Lab 03/04/19  1242      POTASSIUM 3.8   CHLORIDE 104   CO2 28   ANIONGAP 6   GLC 91   BUN 16   CR 0.94   GFRESTIMATED >90   GFRESTBLACK >90   JAY 9.4     Recent Labs   Lab 03/05/19  0407   WBC 6.3   HGB 14.3   HCT 44.6   MCV 83         Imaging:   Recent Results (from the past 48 hour(s))   XR Chest 2 Views    Narrative    CHEST TWO VIEWS March 4, 2019 1:14 PM     HISTORY: Chest pain.    COMPARISON: Chest x-ray 9/28/2011.      Impression    IMPRESSION: PA and lateral views of the chest. Lungs are clear. Heart  is normal in size. No effusions are evident. No pneumothorax.    MD Kendrikc SCHWARTZ , " MD.

## 2019-03-06 ENCOUNTER — APPOINTMENT (OUTPATIENT)
Dept: ULTRASOUND IMAGING | Facility: CLINIC | Age: 54
DRG: 247 | End: 2019-03-06
Attending: INTERNAL MEDICINE
Payer: COMMERCIAL

## 2019-03-06 LAB
ANION GAP SERPL CALCULATED.3IONS-SCNC: 7 MMOL/L (ref 3–14)
BUN SERPL-MCNC: 12 MG/DL (ref 7–30)
CALCIUM SERPL-MCNC: 8.4 MG/DL (ref 8.5–10.1)
CHLORIDE SERPL-SCNC: 108 MMOL/L (ref 94–109)
CO2 SERPL-SCNC: 22 MMOL/L (ref 20–32)
CREAT SERPL-MCNC: 0.76 MG/DL (ref 0.66–1.25)
ERYTHROCYTE [DISTWIDTH] IN BLOOD BY AUTOMATED COUNT: 14.3 % (ref 10–15)
GFR SERPL CREATININE-BSD FRML MDRD: >90 ML/MIN/{1.73_M2}
GLUCOSE BLDC GLUCOMTR-MCNC: 101 MG/DL (ref 70–99)
GLUCOSE BLDC GLUCOMTR-MCNC: 108 MG/DL (ref 70–99)
GLUCOSE BLDC GLUCOMTR-MCNC: 122 MG/DL (ref 70–99)
GLUCOSE BLDC GLUCOMTR-MCNC: 142 MG/DL (ref 70–99)
GLUCOSE BLDC GLUCOMTR-MCNC: 87 MG/DL (ref 70–99)
GLUCOSE BLDC GLUCOMTR-MCNC: 93 MG/DL (ref 70–99)
GLUCOSE SERPL-MCNC: 92 MG/DL (ref 70–99)
HCT VFR BLD AUTO: 43.3 % (ref 40–53)
HGB BLD-MCNC: 13.8 G/DL (ref 13.3–17.7)
HGB BLD-MCNC: 14.2 G/DL (ref 13.3–17.7)
INTERPRETATION ECG - MUSE: NORMAL
MCH RBC QN AUTO: 26.7 PG (ref 26.5–33)
MCHC RBC AUTO-ENTMCNC: 31.9 G/DL (ref 31.5–36.5)
MCV RBC AUTO: 84 FL (ref 78–100)
MRSA DNA SPEC QL NAA+PROBE: NEGATIVE
PLATELET # BLD AUTO: 151 10E9/L (ref 150–450)
POTASSIUM SERPL-SCNC: 3.6 MMOL/L (ref 3.4–5.3)
RBC # BLD AUTO: 5.17 10E12/L (ref 4.4–5.9)
SODIUM SERPL-SCNC: 137 MMOL/L (ref 133–144)
SPECIMEN SOURCE: NORMAL
WBC # BLD AUTO: 8.6 10E9/L (ref 4–11)

## 2019-03-06 PROCEDURE — 85027 COMPLETE CBC AUTOMATED: CPT | Performed by: INTERNAL MEDICINE

## 2019-03-06 PROCEDURE — 00000146 ZZHCL STATISTIC GLUCOSE BY METER IP

## 2019-03-06 PROCEDURE — 99232 SBSQ HOSP IP/OBS MODERATE 35: CPT | Performed by: INTERNAL MEDICINE

## 2019-03-06 PROCEDURE — 25000132 ZZH RX MED GY IP 250 OP 250 PS 637: Performed by: INTERNAL MEDICINE

## 2019-03-06 PROCEDURE — 93926 LOWER EXTREMITY STUDY: CPT

## 2019-03-06 PROCEDURE — 25000132 ZZH RX MED GY IP 250 OP 250 PS 637: Performed by: HOSPITALIST

## 2019-03-06 PROCEDURE — 20000003 ZZH R&B ICU

## 2019-03-06 PROCEDURE — 36415 COLL VENOUS BLD VENIPUNCTURE: CPT | Performed by: INTERNAL MEDICINE

## 2019-03-06 PROCEDURE — 85018 HEMOGLOBIN: CPT | Performed by: INTERNAL MEDICINE

## 2019-03-06 PROCEDURE — 80048 BASIC METABOLIC PNL TOTAL CA: CPT | Performed by: INTERNAL MEDICINE

## 2019-03-06 RX ORDER — ATORVASTATIN CALCIUM 80 MG/1
80 TABLET, FILM COATED ORAL DAILY
Qty: 30 TABLET | Refills: 0 | Status: SHIPPED | OUTPATIENT
Start: 2019-03-06 | End: 2019-04-03

## 2019-03-06 RX ORDER — CARVEDILOL 3.12 MG/1
3.12 TABLET ORAL 2 TIMES DAILY WITH MEALS
Qty: 60 TABLET | Refills: 0 | Status: SHIPPED | OUTPATIENT
Start: 2019-03-06 | End: 2019-04-03

## 2019-03-06 RX ORDER — ASPIRIN 81 MG/1
81 TABLET ORAL DAILY
Qty: 30 TABLET | Refills: 0 | Status: SHIPPED | OUTPATIENT
Start: 2019-03-06 | End: 2019-04-03

## 2019-03-06 RX ORDER — NITROGLYCERIN 0.4 MG/1
TABLET SUBLINGUAL
Qty: 15 TABLET | Refills: 0 | Status: SHIPPED | OUTPATIENT
Start: 2019-03-06 | End: 2019-04-03

## 2019-03-06 RX ORDER — LISINOPRIL 10 MG/1
10 TABLET ORAL DAILY
Qty: 30 TABLET | Refills: 0 | Status: SHIPPED | OUTPATIENT
Start: 2019-03-06 | End: 2019-04-03

## 2019-03-06 RX ADMIN — ATORVASTATIN CALCIUM 80 MG: 40 TABLET, FILM COATED ORAL at 21:02

## 2019-03-06 RX ADMIN — LISINOPRIL 10 MG: 10 TABLET ORAL at 21:02

## 2019-03-06 RX ADMIN — ASPIRIN 81 MG: 81 TABLET, COATED ORAL at 08:19

## 2019-03-06 RX ADMIN — CALCIUM CARBONATE (ANTACID) CHEW TAB 500 MG 1000 MG: 500 CHEW TAB at 00:18

## 2019-03-06 RX ADMIN — TICAGRELOR 90 MG: 90 TABLET ORAL at 06:53

## 2019-03-06 RX ADMIN — CARVEDILOL 3.12 MG: 3.12 TABLET, FILM COATED ORAL at 17:59

## 2019-03-06 RX ADMIN — CARVEDILOL 3.12 MG: 3.12 TABLET, FILM COATED ORAL at 08:19

## 2019-03-06 RX ADMIN — TICAGRELOR 90 MG: 90 TABLET ORAL at 17:59

## 2019-03-06 ASSESSMENT — ACTIVITIES OF DAILY LIVING (ADL)
ADLS_ACUITY_SCORE: 11

## 2019-03-06 NOTE — PROVIDER NOTIFICATION
Cardiology notified of presence of hematoma at R Femoral site. Pressure applied for 20 minutes. Resume bedrest per existing order. Hospitalist updated on status. Repeat hgb and cardiology to return to bedside.

## 2019-03-06 NOTE — PROCEDURES
Heart cath prelim  Lma ok  Lad old prox stent ok, new long lesion in mid lad to 99% and diag 2 (small) occluded  Lcx stent ok  RCA normal  LVG ND  Complex bifurcation balloon (D2)/Stent Lad with good result  Closed with 8fr angioseal, no complications  Not pt with h/o non compliance with meds, this should be reenforced to take--I spoke with pt and family about this

## 2019-03-06 NOTE — PROGRESS NOTES
[facility]  Hospitalist Progress Note  Shane Benz MD 03/06/19    Reason for Stay (Diagnosis): NSTEMI         Assessment and Plan:      Summary of Stay: Viviana Gilman is a 53 year old male with past medical history of CAD s/p stents, HTN, and HLD was admitted on 3/4/2019 with exertional chest pain shortness of breath found to have an NSTEMI with peak troponin of 0.163.  Started on heparin drip and cardiology was consulted.  TTE was obtained that showed an EF of 55-60% and apical hypokinesis.  Underwent coronary angiogram with PCI to the mid LAD.  Brilinta and carvedilol added to regimen.  LDL high so statin dose increased.  Had planned discharge, but patient developed golf ball size swelling in his femoral catheterization site.  Obtaining arterial ultrasound to reassess and bed rest for now.  Discharge canceled.    Problem List/Assessment and Plan:   NSTEMI, CAD: Exertional chest pain and shortness of breath.  Troponin peak of 0.16 consistent with NSTEMI.  TTE with preserved EF, but apical wall hypokinesis.  Previous stenting to LAD and left circumflex.  Taking aspirin, atorvastatin 40 mg daily, and lisinopril 10 mg daily at home.  -Cardiology consulted  -Aspirin 1 mg daily, Brilinta twice daily, lisinopril 10 mg daily, carvedilol 3.125 mg twice daily, atorvastatin 80 mg at bedtime  -Golf ball sized swelling at femoral catheter site, cardiology has ordered an arterial ultrasound to evaluate, bedrest for now  -We will need cardiac rehab and recommendations regarding return to work  -Follow-up with his cardiologist or LOAN in clinic in 7-10 days    HTN: Lisinopril 10 mg daily.  Carvedilol 3.125 mg twice daily.  Fill prescriptions on discharge.    HLD: LDL elevated.  Increase atorvastatin to 80 mg at bedtime.    DVT Prophylaxis: Low Risk/Ambulatory with no VTE prophylaxis indicated  Code Status: Full Code  FEN: cardiac diet  Discharge Dispo: home  Estimated Disch Date / # of Days until Disch: Cancel  "discharge for today due to swelling at femoral catheter site.  Monitor overnight        Interval History (Subjective):      Denies chest pain or shortness of breath.  Currently symptomatic.  Tolerating p.o.  Had planned discharge, but developed golf ball size swelling at his femoral catheter site.                  Physical Exam:      Last Vital Signs:  /85   Pulse 60   Temp 98.3  F (36.8  C) (Oral)   Resp 12   Ht 1.753 m (5' 9\")   Wt 78.7 kg (173 lb 6.4 oz)   SpO2 97%   BMI 25.61 kg/m        Intake/Output Summary (Last 24 hours) at 3/6/2019 1354  Last data filed at 3/6/2019 0800  Gross per 24 hour   Intake 980 ml   Output 1550 ml   Net -570 ml       Constitutional: Awake, NAD   Eyes: sclera white   HEENT:  MMM  Respiratory:  , lungs cta bilaterally, no crackles or wheeze  Cardiovascular: RRR.  No murmur   GI: non-tender, not distended, bowel sounds present  Skin: no rash   Musculoskeletal/extremities: No edema  Neurologic: A&O  Psychiatric: calm, cooperative, normal affect         Medications:      All current medications were reviewed with changes reflected in problem list.         Data:      All new lab and imaging data was reviewed.   Labs:  Recent Labs   Lab 03/06/19  0636 03/04/19  1242    138   POTASSIUM 3.6 3.8   CHLORIDE 108 104   CO2 22 28   ANIONGAP 7 6   GLC 92 91   BUN 12 16   CR 0.76 0.94   GFRESTIMATED >90 >90   GFRESTBLACK >90 >90   JAY 8.4* 9.4     Recent Labs   Lab 03/06/19  1236 03/06/19  0636 03/05/19  0407 03/04/19  1525   WBC  --  8.6 6.3 6.7   HGB 14.2 13.8 14.3 14.7   HCT  --  43.3 44.6 45.7   MCV  --  84 83 85   PLT  --  151 157 165      Imaging:   Arterial ultrasound pending      Shane Benz MD        "

## 2019-03-06 NOTE — PLAN OF CARE
ICU End of Shift Summary.  For vital signs and complete assessments, please see documentation flowsheets.     Pertinent assessments: VSS. Alert and Oriented. BS active.   Major Shift Events: Mild chest pressure 2/10. Site has scant amount of bleeding. Good CMS.  Plan (Upcoming Events): Continue bedrest and monitoring of angiosite  Discharge/Transfer Needs: TBD    Bedside Shift Report Completed : Yes  Bedside Safety Check Completed: Yes

## 2019-03-06 NOTE — PLAN OF CARE
ICU End of Shift Summary.  For vital signs and complete assessments, please see documentation flowsheets.     Pertinent assessments: Patient is A&O. VSS. Afebrile. Abd pain x1; PRN TUMS given with relief.     Major Shift Events: Angioseal with small amount  Of bleeding; no signs of hematoma, bruising, or redness.  Plan (Upcoming Events): ? Discharge today  Discharge/Transfer Needs: Medication education    Bedside Shift Report Completed :   Bedside Safety Check Completed:

## 2019-03-06 NOTE — PROGRESS NOTES
"Cardiology    I was notified the patient developed a \"golf ball\" sized hematoma. He is at bedrest now.Access site mildly tender no bruit, palpable pulse. No large swelling while supine.     Plan  1) check hemoglobin  2) bedrest  3) vascular ultrasound to exclude psuedoaneurysm  4) assuming no further problems, will observe overnight then discharge in AM    [place]  "

## 2019-03-06 NOTE — PHARMACY - DISCHARGE MEDICATION RECONCILIATION AND EDUCATION
Discharge medication review for this patient is complete.   Patient was counseled on new medication regimen.  See EPIC for allergy information, prior to admission medications and immunization status.   Pharmacist assisted with medication reconciliation of discharge medications with PTA medications.    MD was contacted with any questions/concerns:None    Additional medication history information:None    Discharge Medication List     Review of your medicines      START taking      Dose / Directions   carvedilol 3.125 MG tablet  Commonly known as:  COREG      Dose:  3.125 mg  Take 1 tablet (3.125 mg) by mouth 2 times daily (with meals)  Quantity:  60 tablet  Refills:  0     nitroGLYcerin 0.4 MG sublingual tablet  Commonly known as:  NITROSTAT      For chest pain place 1 tablet under the tongue every 5 minutes for 3 doses. If symptoms persist 5 minutes after 1st dose call 911.  Quantity:  15 tablet  Refills:  0     ticagrelor 90 MG tablet  Commonly known as:  BRILINTA      Dose:  90 mg  Take 1 tablet (90 mg) by mouth every 12 hours  Quantity:  60 tablet  Refills:  0        CONTINUE these medicines which may have CHANGED, or have new prescriptions. If we are uncertain of the size of tablets/capsules you have at home, strength may be listed as something that might have changed.      Dose / Directions   aspirin 81 MG EC tablet  This may have changed:  when to take this      Dose:  81 mg  Take 1 tablet (81 mg) by mouth daily  Quantity:  30 tablet  Refills:  0     atorvastatin 80 MG tablet  Commonly known as:  LIPITOR  This may have changed:      medication strength    how much to take  Used for:  Old myocardial infarction, Essential hypertension      Dose:  80 mg  Take 1 tablet (80 mg) by mouth daily  Quantity:  30 tablet  Refills:  0        CONTINUE these medicines which have NOT CHANGED      Dose / Directions   lisinopril 10 MG tablet  Commonly known as:  PRINIVIL/ZESTRIL  Used for:  Essential hypertension      Dose:  10  mg  Take 1 tablet (10 mg) by mouth daily  Quantity:  30 tablet  Refills:  0           Where to get your medicines      These medications were sent to [place], [place] - [address] [facility]  [address] [facility] [address]    Phone:  [phone]     aspirin 81 MG EC tablet    atorvastatin 80 MG tablet    carvedilol 3.125 MG tablet    lisinopril 10 MG tablet    nitroGLYcerin 0.4 MG sublingual tablet    ticagrelor 90 MG tablet

## 2019-03-06 NOTE — PROGRESS NOTES
[facility]    Cardiology Progress Note    Assessment & Plan   Viviana Gilman is a 53 year old male who was admitted on 3/4/2019.     1.  NSTEMI sp PCI LAD/D3 3/5/ 2019. Old AWMI stent LAD/M1  2.  HTN  3.  Dyslipidemia    Recommendations  1) Discharge today ok with cardiology. Should follow up with 's advanced level practitioner in 7 to 10 days.   2) I have reinforced need to be compliant with antiplatelets to avoid stent thrombosis which can result in MI/cardiac death.   3) Cardiac rehabilitation to assist with assuring compliance, education regarding lifestyle    Fam Muñoz MD    Interval History   Successful PCI proximal/mid LAD /D3. Groin site mildly tender, but he is ambulatory without problems.     Physical Exam   Temp: 97.7  F (36.5  C) Temp src: Oral BP: 131/88 Pulse: 78 Heart Rate: 71 Resp: 15 SpO2: 97 % O2 Device: None (Room air) Oxygen Delivery: 2 LPM  Vitals:    03/04/19 1224 03/04/19 1450 03/05/19 0555   Weight: 79.8 kg (176 lb) 79.6 kg (175 lb 8 oz) 78.7 kg (173 lb 6.4 oz)     Vital Signs with Ranges  Temp:  [97.6  F (36.4  C)-98.1  F (36.7  C)] 97.7  F (36.5  C)  Pulse:  [56-78] 78  Heart Rate:  [55-76] 71  Resp:  [7-18] 15  BP: (120-152)/(78-97) 131/88  SpO2:  [94 %-100 %] 97 %  I/O last 3 completed shifts:  In: 880 [P.O.:470; I.V.:410]  Out: 1550 [Urine:1550]    Constitutional: Awake, alert, cooperative, no apparent distress  Lungs: Clear to auscultation bilaterally, no crackles or wheezing  Cardiovascular: Regular rate and rhythm, normal S1 and S2, and no murmur noted  Abdomen: Normal bowel sounds, soft, non-distended, non-tender  Skin: No rashes, no cyanosis, no edema  Other:  Groin site minimal tenderness at access site. No hematoma.       Medications     Continuing ACE inhibitor/ARB/ARNI from home medication list OR ACE inhibitor/ARB order already placed during this visit       Continuing ACE inhibitor/ARB/ARNI from home medication list OR ACE inhibitor/ARB order already  placed during this visit       - MEDICATION INSTRUCTIONS -       - MEDICATION INSTRUCTIONS -       - MEDICATION INSTRUCTIONS -       - MEDICATION INSTRUCTIONS -       - MEDICATION INSTRUCTIONS -       Percutaneous Coronary Intervention orders placed (this is information for BPA alerting)       BETA BLOCKER NOT PRESCRIBED         aspirin  81 mg Oral Daily     atorvastatin  80 mg Oral Daily     carvedilol  3.125 mg Oral BID w/meals     lisinopril  10 mg Oral Daily     sodium chloride (PF)  3 mL Intracatheter Q8H     ticagrelor  90 mg Oral Q12H       Data   I personally reviewed  His PCI procedure films. Labs.   Recent Labs   Lab 03/06/19  0636 03/05/19  0407 03/04/19 2005 03/04/19  1525 03/04/19  1245 03/04/19  1242   WBC 8.6 6.3  --  6.7  --  6.4   HGB 13.8 14.3  --  14.7  --  15.2   MCV 84 83  --  85  --  84    157  --  165  --  164     --   --   --   --  138   POTASSIUM 3.6  --   --   --   --  3.8   CHLORIDE 108  --   --   --   --  104   CO2 22  --   --   --   --  28   BUN 12  --   --   --   --  16   CR 0.76  --   --   --   --  0.94   ANIONGAP 7  --   --   --   --  6   TROPI  --   --  0.109* 0.163*  --  0.154*   TROPONIN  --   --   --   --  0.10*  --      No results found for this or any previous visit (from the past 24 hour(s)).

## 2019-03-06 NOTE — PLAN OF CARE
PT: Received orders for cardiac evaluation.  Patient currently on 4 hours of bedrest.  Per discussion with nursing, will hold cardiac evaluation for today and reschedule for tomorrow morning.

## 2019-03-07 ENCOUNTER — APPOINTMENT (OUTPATIENT)
Dept: PHYSICAL THERAPY | Facility: CLINIC | Age: 54
DRG: 247 | End: 2019-03-07
Attending: PHYSICIAN ASSISTANT
Payer: COMMERCIAL

## 2019-03-07 VITALS
SYSTOLIC BLOOD PRESSURE: 118 MMHG | WEIGHT: 173.4 LBS | RESPIRATION RATE: 14 BRPM | HEIGHT: 69 IN | TEMPERATURE: 98 F | OXYGEN SATURATION: 100 % | BODY MASS INDEX: 25.68 KG/M2 | HEART RATE: 71 BPM | DIASTOLIC BLOOD PRESSURE: 83 MMHG

## 2019-03-07 PROCEDURE — 97530 THERAPEUTIC ACTIVITIES: CPT | Mod: GP

## 2019-03-07 PROCEDURE — 25000132 ZZH RX MED GY IP 250 OP 250 PS 637: Performed by: INTERNAL MEDICINE

## 2019-03-07 PROCEDURE — 99239 HOSP IP/OBS DSCHRG MGMT >30: CPT | Performed by: INTERNAL MEDICINE

## 2019-03-07 PROCEDURE — 25000132 ZZH RX MED GY IP 250 OP 250 PS 637: Performed by: HOSPITALIST

## 2019-03-07 PROCEDURE — 99232 SBSQ HOSP IP/OBS MODERATE 35: CPT | Performed by: INTERNAL MEDICINE

## 2019-03-07 PROCEDURE — 97161 PT EVAL LOW COMPLEX 20 MIN: CPT | Mod: GP

## 2019-03-07 PROCEDURE — 97110 THERAPEUTIC EXERCISES: CPT | Mod: GP

## 2019-03-07 RX ADMIN — CARVEDILOL 3.12 MG: 3.12 TABLET, FILM COATED ORAL at 08:02

## 2019-03-07 RX ADMIN — TICAGRELOR 90 MG: 90 TABLET ORAL at 05:36

## 2019-03-07 RX ADMIN — ASPIRIN 81 MG: 81 TABLET, COATED ORAL at 08:02

## 2019-03-07 ASSESSMENT — ACTIVITIES OF DAILY LIVING (ADL)
ADLS_ACUITY_SCORE: 11

## 2019-03-07 NOTE — PLAN OF CARE
ICU End of Shift Summary.  For vital signs and complete assessments, please see documentation flowsheets.      Pertinent assessments: AxO. VSS. Afebrile. Denies pain. Tele SR, ST depression lead 2.    Major Shift Events: Hematoma resolved with pressure (applied 20 minutes), bedrest resumed for 6 hours. U/S negative. Hgb 14.2.  Plan (Upcoming Events): Discharge tomorrow after Cardiac rehab  Discharge/Transfer Needs: Medication education, discharge meds in lock box in room, hospitalist to write letter for patient's employer, would like feedback from cardiology and cardiac rehab      Bedside Shift Report Completed : y  Bedside Safety Check Completed: y

## 2019-03-07 NOTE — DISCHARGE SUMMARY
[facility]  Discharge Summary  Name: Viviana Gilman    MRN: [id number]  YOB: 1965    Age: 53 year old  Date of Discharge:  3/7/2019  Date of Admission: 3/4/2019  Primary Care Provider: Dorys Vanessa  Discharge Physician:  Shane Benz MD  Discharging Service:  Hospitalist      Discharge Diagnoses:  NSTEMI  CAD  HTN  HLD     Hospital Course:  Summary of Stay: Viviana Gilman is a 53 year old male with past medical history of CAD s/p stents, HTN, and HLD was admitted on 3/4/2019 with exertional chest pain shortness of breath found to have an NSTEMI with peak troponin of 0.163.  Started on heparin drip and cardiology was consulted.  TTE was obtained that showed an EF of 55-60% and apical hypokinesis.  Underwent coronary angiogram with PCI to the mid LAD.  Brilinta and carvedilol added to regimen.  LDL high so statin dose increased.  Had planned discharge 3/6, but patient developed golf ball size swelling in his femoral catheterization site.    Vascular ultrasound was obtained that did not show any sign of aneurysm.  Swelling resolved with pressure.  Seen by cardiac rehab.  Medications filled.  Discharge home today.     Problem List/Assessment and Plan:   NSTEMI, CAD: Exertional chest pain and shortness of breath.  Troponin peak of 0.16 consistent with NSTEMI.  TTE with preserved EF, but apical wall hypokinesis.  Previous stenting to LAD and left circumflex.  Taking aspirin, atorvastatin 40 mg daily, and lisinopril 10 mg daily at home.  -Cardiology consulted  -Aspirin 81 mg daily, Brilinta twice daily, lisinopril 10 mg daily, carvedilol 3.125 mg twice daily, atorvastatin 80 mg at bedtime  -Golf ball sized swelling at femoral catheter site 3/6.  Vascular ultrasound was obtained that did not show any sign of aneurysm.  Swelling resolved with pressure.  -Cardiac rehab  -Follow-up with his cardiologist Dr. Fleming or LOAN in clinic in 7-10 days     HTN: Lisinopril 10 mg daily.  Carvedilol 3.125 mg  twice daily.  Fill prescriptions on discharge.     HLD: LDL elevated.  Increase atorvastatin to 80 mg at bedtime.     Discharge Disposition:  Discharged to home     Allergies:  No Known Allergies     Discharge Medications:   Current Discharge Medication List      START taking these medications    Details   carvedilol (COREG) 3.125 MG tablet Take 1 tablet (3.125 mg) by mouth 2 times daily (with meals)  Qty: 60 tablet, Refills: 0    Comments: Future refills by PCP Dr. Dorys Vanessa with phone number [phone].  Associated Diagnoses: Coronary artery disease involving native coronary artery of native heart without angina pectoris      nitroGLYcerin (NITROSTAT) 0.4 MG sublingual tablet For chest pain place 1 tablet under the tongue every 5 minutes for 3 doses. If symptoms persist 5 minutes after 1st dose call 911.  Qty: 15 tablet, Refills: 0    Comments: Future refills by PCP Dr. Dorys Vanessa with phone number [phone].  Associated Diagnoses: Coronary artery disease involving native coronary artery of native heart without angina pectoris      ticagrelor (BRILINTA) 90 MG tablet Take 1 tablet (90 mg) by mouth every 12 hours  Qty: 60 tablet, Refills: 0    Comments: Future refills by PCP Dr. Dorys Vanessa with phone number [phone].  Associated Diagnoses: Coronary artery disease involving native coronary artery of native heart without angina pectoris         CONTINUE these medications which have CHANGED    Details   aspirin 81 MG EC tablet Take 1 tablet (81 mg) by mouth daily  Qty: 30 tablet, Refills: 0    Comments: Future refills by PCP Dr. Dorys Vanessa with phone number [phone].  Associated Diagnoses: Coronary artery disease involving native coronary artery of native heart without angina pectoris      atorvastatin (LIPITOR) 80 MG tablet Take 1 tablet (80 mg) by mouth daily  Qty: 30 tablet, Refills: 0    Associated Diagnoses: Old myocardial infarction; Essential hypertension     "  lisinopril (PRINIVIL/ZESTRIL) 10 MG tablet Take 1 tablet (10 mg) by mouth daily  Qty: 30 tablet, Refills: 0    Comments: Future refills by PCP Dr. Dorys Vanessa with phone number [phone].  Associated Diagnoses: Essential hypertension              Condition on Discharge:  Discharge condition: Good   Discharge vitals: Blood pressure (!) 144/106, pulse 71, temperature 98.1  F (36.7  C), temperature source Oral, resp. rate 12, height 1.753 m (5' 9\"), weight 78.7 kg (173 lb 6.4 oz), SpO2 100 %.   Code status on discharge: Full Code     History of Illness:  See detailed admission note for full details.    Physical Exam:  Blood pressure (!) 144/106, pulse 71, temperature 98.1  F (36.7  C), temperature source Oral, resp. rate 12, height 1.753 m (5' 9\"), weight 78.7 kg (173 lb 6.4 oz), SpO2 100 %.  Wt Readings from Last 1 Encounters:   03/05/19 78.7 kg (173 lb 6.4 oz)     Constitutional: Awake, NAD   Eyes: sclera white   HEENT:  MMM  Respiratory: no respiratory distress, lungs cta bilaterally, no crackles or wheeze  Cardiovascular: RRR.  No murmur   GI: non-tender, not distended, bowel sounds present  Skin: no rash or lesions, acyanotic  Musculoskeletal/extremities:  No edema  Neurologic: A&O, speech clear, relating in  Psychiatric: calm, cooperative, normal affect    Procedures other than Imaging:  None     Imaging:  Results for orders placed or performed during the hospital encounter of 03/04/19   XR Chest 2 Views    Narrative    CHEST TWO VIEWS March 4, 2019 1:14 PM     HISTORY: Chest pain.    COMPARISON: Chest x-ray 9/28/2011.      Impression    IMPRESSION: PA and lateral views of the chest. Lungs are clear. Heart  is normal in size. No effusions are evident. No pneumothorax.    LAYTON MCCLOUD MD   Us Post Vascular Access Low Ext Duplex    Narrative    ULTRASOUND POST VASCULAR ACCESS LOW EXT DUPLEX   3/6/2019 1:51 PM     HISTORY: History of PCI with femoral closure device, new hematoma,  exclude pseudo " aneurysm.    COMPARISON: Angiogram 3/5/2019    FINDINGS: Color Doppler and spectral waveform analysis was performed  throughout the right common femoral artery, right common femoral vein,  right external iliac artery, and right external iliac vein. Images  show no pseudoaneurysm, AV fistula or hematoma.      Impression    IMPRESSION: Patent veins and arteries in the right groin without  evidence of pseudoaneurysm, fistula or hematoma.    MARIE WELLS DO        Consultations:  Consultation during this admission received from cardiology.       Recent Lab Results:  Recent Labs   Lab 03/06/19  1236 03/06/19  0636 03/05/19  0407 03/04/19  1525   WBC  --  8.6 6.3 6.7   HGB 14.2 13.8 14.3 14.7   HCT  --  43.3 44.6 45.7   MCV  --  84 83 85   PLT  --  151 157 165     Recent Labs   Lab 03/06/19  0636 03/04/19  1242    138   POTASSIUM 3.6 3.8   CHLORIDE 108 104   CO2 22 28   ANIONGAP 7 6   GLC 92 91   BUN 12 16   CR 0.76 0.94   GFRESTIMATED >90 >90   GFRESTBLACK >90 >90   JAY 8.4* 9.4     Recent Labs   Lab 03/05/19  2141   CHOL 186   HDL 47   *   TRIG 110     Recent Labs   Lab 03/04/19  2005 03/04/19  1525 03/04/19  1245 03/04/19  1242   TROPONIN  --   --  0.10*  --    TROPI 0.109* 0.163*  --  0.154*          Pending Results:    Unresulted Labs Ordered in the Past 30 Days of this Admission     No orders found from 1/3/2019 to 3/5/2019.         These results will be followed up by patient's primary care provider.    Discharge Instructions and Follow-Up:   Discharge Procedure Orders   Lipid Profile   Standing Status: Future Standing Exp. Date: 03/06/20     Order Specific Question Answer Comments   Perform labs while fasting or non-fasting? Fasting      ALT   Standing Status: Future Standing Exp. Date: 03/06/20   Order Comments: Last Lab Result: ALT (U/L)       Date                     Value                 02/08/2017               32               ----------     CARDIAC REHAB REFERRAL   Standing Status: Future  Standing Exp. Date: 03/05/20   Referral Type: Rehab Therapy Cardiac Therapy   Number of Visits Requested: 1     Follow-Up with Cardiac Advanced Practice Provider   Standing Status: Future Standing Exp. Date: 03/05/20   Number of Visits Requested: 1     Follow-Up with Cardiologist   Standing Status: Future Standing Exp. Date: 03/06/20   Number of Visits Requested: 1     Reason for your hospital stay   Order Comments: You were hospitalized for a heart attack and had a stent placed.  Your medications have been adjusted and new medications added.  It is extremely important you take your medications as prescribed.  Nitroglycerin can be used as needed if you had recurrence of chest pain.  If this occurs you should be seen in the emergency department for further evaluation.  You will have follow up with cardiology in clinic and with your primary care doctor for further refills on your medications.  One month supply of medications provided on discharge.     Follow-up and recommended labs and tests    Order Comments: Follow up with primary care provider, Dorys Vanessa, within 7-14 days to evaluate medication change and for hospital follow- up.  No follow up labs or test are needed.    Follow up with advanced practice provider for Dr. Fleming in cardiology clinic in 7 to 10 days     Activity   Order Comments: Your activity upon discharge: activity as tolerated and as instructed by cardiac rehab team     Order Specific Question Answer Comments   Is discharge order? Yes      Full Code     Order Specific Question Answer Comments   Code status determined by: Discussion with patient/legal decision maker      Diet   Order Comments: Follow this diet upon discharge: Cardiac, low saturated fat diet     Order Specific Question Answer Comments   Is discharge order? Yes          IShane, personally saw the patient today and spent greater than 30 minutes discharging this patient.    Shane Benz MD

## 2019-03-07 NOTE — PROGRESS NOTES
03/07/19 0900   Quick Adds   Type of Visit Initial PT Evaluation  (CR)   Living Environment   Living Arrangements house   Home Accessibility stairs to enter home   Living Environment Comment Pt IND at baseline with mobility/ADLs. Works full time in a physical job (will walk 6-7 miles in a work day and be lifting).    Self-Care   Usual Activity Tolerance excellent   Current Activity Tolerance fair   Functional Level Prior   Ambulation 0-->independent   Transferring 0-->independent   General Information   Onset of Illness/Injury or Date of Surgery - Date 03/04/19   Referring Physician Suzanne WALLS   Patient/Family Goals Statement Return home   Pertinent History of Current Problem (include personal factors and/or comorbidities that impact the POC) 53 year old male admitted with NSTEMI s/p PCI.   Cognitive Status Examination   Orientation orientation to person, place and time   Level of Consciousness alert   Strength   Strength Comments No strength deficits identified.    Gait   Gait Comments IND with all functional mobility   Balance   Balance Comments No balance deficits identified with dynamic mobility   General Therapy Interventions   Planned Therapy Interventions risk factor education  (CR education needs)   Clinical Impression   Criteria for Skilled Therapeutic Intervention yes, treatment indicated   PT Diagnosis Decreased functional endruance tolerance   Influenced by the following impairments Decreased tolerance for functional activities   Functional limitations due to impairments CR restrictions.    Clinical Presentation Evolving/Changing   Clinical Presentation Rationale Changing BP this session; but overall stable.    Clinical Decision Making (Complexity) Low complexity   Therapy Frequency` other (see comments)  (One treat)   Predicted Duration of Therapy Intervention (days/wks) One day   Anticipated Discharge Disposition Home with Outpatient Therapy  (CR)   Risk & Benefits of therapy have been explained Yes  "  Patient, Family & other staff in agreement with plan of care Yes   [facility] AM-PAC TM \"6 Clicks\"   2016, Trustees of [facility], under license to [employer].  All rights reserved.   6 Clicks Short Forms Basic Mobility Inpatient Short Form   [facility] AM-PAC  \"6 Clicks\" V.2 Basic Mobility Inpatient Short Form   1. Turning from your back to your side while in a flat bed without using bedrails? 4 - None   2. Moving from lying on your back to sitting on the side of a flat bed without using bedrails? 4 - None   3. Moving to and from a bed to a chair (including a wheelchair)? 4 - None   4. Standing up from a chair using your arms (e.g., wheelchair, or bedside chair)? 4 - None   5. To walk in hospital room? 4 - None   6. Climbing 3-5 steps with a railing? 4 - None   Basic Mobility Raw Score (Score out of 24.Lower scores equate to lower levels of function) 24   Total Evaluation Time   Total Evaluation Time (Minutes) 5     "

## 2019-03-07 NOTE — PLAN OF CARE
CR: Orders received, evaluation/treatment completed. 53 year old male admitted with NSTEMI s/p PCI. Pt IND at baseline with mobility/ADLs. Works full time in a physical job (will walk 6-7 miles in a work day and be lifting).     Discharge Planner PT   Patient plan for discharge: Home with OP CR  Current status: IND with transfers and ambulation. BP elevated pre mobility, DBP further increases post mobility. BP post ambulation 144/106. See VSFS for all exact measurements. Provided post MI education including healing after a HA and safe activities after a HA. Educated on role of OP CR.   Barriers to return to prior living situation: None from a mobility standpoint   Recommendations for discharge: Home with OP CR  Rationale for recommendations: Pt mobilizing safe for return to home when medically ready. CR education completed. Will complete IP CR order at this time.        Entered by: Airam Yan 03/07/2019 8:57 AM     Cardiac Rehab Discharge Summary    Reason for therapy discharge:    All goals and outcomes met, no further needs identified.    Progress towards therapy goal(s). See goals on Care Plan in [facility] electronic health record for goal details.  Goals met    Therapy recommendation(s):    OP CR to continue heart health education and safe mobility progression with vitals monitoring.

## 2019-03-07 NOTE — PLAN OF CARE
ICU End of Shift Summary.  For vital signs and complete assessments, please see documentation flowsheets.     Pertinent assessments: AxO. VSS. Afebrile. Denies pain. SR. LS clear. BP slightly elevated. Urinating without difficulty. Tolerating heart healthy diet.   Major Shift Events: Cardiac rehab, ok for discharge per cardiology  Plan (Upcoming Events): Follow up with PCP 7-14 days, cardiology appt scheduled    Discharge instructions reviewed. Questions answered. Patient collected personal belongings.

## 2019-03-07 NOTE — PROGRESS NOTES
[facility]  Cardiology Progress Note    Outpatient cardiologist: Dr. Fleming    Date of Service (when I saw the patient): 03/07/2019    Summary: Viviana Gilman is a 53 year old male [place], with history of CAD, HTN, dyslipidemia, who was admitted on 3/4/2019 with NSTEMI.  3/3/19 PM he had onset of substernal chest discomfort radiating to the left arm with exertion, improved by rest.  The discomfort continued to bother him over the next subsequent hours, and he presented to the emergency room.  ECG showed no acute changes, but troponin levels were mildly positive.  The patient was started on intravenous heparin, continued on aspirin and atorvastatin, and cardiology consultation was requested.   Also Coreg started.    Cardiac cath 3/5/19: Prior stents patent.  New lesion in mid LAD.  Occluded D2 (small).  S/p COURTNEY rto mid LAD.    Day of planned discharge, he had groin hematoma. Pressure held and hematoma resolved.  US without PSA, AVF, or hematoma.       Interval History   Tele:  SR with occasional PVCs, no sustained arrhythmias     He has some mild discomfort at times.  Not like what brought him in.   Otherwise, feeling good.   No bleeding.        Assessment & Plan   NSTEMI  H/o CAD  - Anterior wall ST segment elevation 09/28/2011.    Dominant right coronary with no significant narrowing.  Left main short with separate ostia for LAD and circumflex.  LAD with thrombotic 90% narrowing.   80% narrowing noted in obtuse marginal branch.   S/p aspiration thrombectomy COURTNEY to LAD  S/p staged intervention with COURTNEY to OM   - Now with NSTEMI  - ECG without ischemic changes  - Trop 0.154, 0.163, 0.109  - Echo 3/5/19: LVEF 55-60%, small area of severe apical HK  - Cardiac cath 3/5/19: Prior stents patent.  New lesion in mid LAD.  Occluded D2 (small).  S/p COURTNEY rto mid LAD.    - Continue ASA 81 mg lifelong, Brilinta 90 mg BID x 1 year minimum (new), Coreg 3.125 mg BID (new), lisinopril 10 mg, atorvastatin  (increased to 80 mg).    - Cardiac rehab  - Mediterranean style diet      HTN  - BP improved on lisinopril 10 mg (home med) and Coreg 3.125 mg BID (new)  - In follow up, can titrate lisinopril as needed  - Follow      Dyslipidemia  - PTA on atorvastatin 40 mg  - FLP 2/8/17: , HDL 43, ,   - Atorvastatin has been increased to 80 mg  - Will need FLP in about 2-3 months         DISPO:  OK to discharge to home today.   I will write a note to be off work today-Sunday (he works Thursday-Sunday).       Follow up:  [facility]    3/18/19 with Bernadette Palmer NP at 8:10 AM    Dr. Fleming with FLP/ALT in 2-3 months       Heidi Chester PA-C      Cardiology staff.   I have personally examined the patient and reviewed his laboratory results. The ultrasound shows no PSA or fistula. Groin site ok  Lungs clear normal hear tones.     Plan discharge today   [place]      Patient Active Problem List   Diagnosis     Coronary artery disease involving native coronary artery without angina pectoris     Mixed hyperlipidemia     History of coronary angiogram     Hypertension goal BP (blood pressure) < 140/90     Old myocardial infarction     ACS (acute coronary syndrome) (H)       Physical Exam   Temp: 98.1  F (36.7  C) Temp src: Oral BP: (!) 144/106(Post PT/CR) Pulse: 71 Heart Rate: 70 Resp: 12 SpO2: 100 % O2 Device: None (Room air)    Vitals:    03/04/19 1224 03/04/19 1450 03/05/19 0555   Weight: 79.8 kg (176 lb) 79.6 kg (175 lb 8 oz) 78.7 kg (173 lb 6.4 oz)     Vital Signs with Ranges  Temp:  [97.9  F (36.6  C)-98.3  F (36.8  C)] 98.1  F (36.7  C)  Pulse:  [56-78] 71  Heart Rate:  [56-72] 70  Resp:  [9-50] 12  BP: (108-153)/() 144/106  SpO2:  [92 %-100 %] 100 %  I/O last 3 completed shifts:  In: 700 [P.O.:700]  Out: -     Constitutional: NAD.   Respiratory: CTAB.   Cardiovascular: RRR, s1s2, no sig murmur  GI: soft, BS+  Skin: warm, no rashes  Musculoskeletal: Moving all extremities  Neurologic: Alert,  oriented x 3  Neuropsychiatric: Normal affect       Data   Recent Labs   Lab 03/06/19  1236 03/06/19  0636 03/05/19  0407 03/04/19 2005 03/04/19  1525 03/04/19  1245 03/04/19  1242   WBC  --  8.6 6.3  --  6.7  --  6.4   HGB 14.2 13.8 14.3  --  14.7  --  15.2   MCV  --  84 83  --  85  --  84   PLT  --  151 157  --  165  --  164   NA  --  137  --   --   --   --  138   POTASSIUM  --  3.6  --   --   --   --  3.8   CHLORIDE  --  108  --   --   --   --  104   CO2  --  22  --   --   --   --  28   BUN  --  12  --   --   --   --  16   CR  --  0.76  --   --   --   --  0.94   ANIONGAP  --  7  --   --   --   --  6   JAY  --  8.4*  --   --   --   --  9.4   GLC  --  92  --   --   --   --  91   TROPI  --   --   --  0.109* 0.163*  --  0.154*   TROPONIN  --   --   --   --   --  0.10*  --      Recent Results (from the past 24 hour(s))   Us Post Vascular Access Low Ext Duplex    Narrative    ULTRASOUND POST VASCULAR ACCESS LOW EXT DUPLEX   3/6/2019 1:51 PM     HISTORY: History of PCI with femoral closure device, new hematoma,  exclude pseudo aneurysm.    COMPARISON: Angiogram 3/5/2019    FINDINGS: Color Doppler and spectral waveform analysis was performed  throughout the right common femoral artery, right common femoral vein,  right external iliac artery, and right external iliac vein. Images  show no pseudoaneurysm, AV fistula or hematoma.      Impression    IMPRESSION: Patent veins and arteries in the right groin without  evidence of pseudoaneurysm, fistula or hematoma.    MARIE WELLS DO       Echo 3/5/19:  Interpretation Summary     The visual ejection fraction is estimated at 55-60%.  A small area of severe apical hypokinesis noted.  The ascending aorta is Mildly dilated.  The study was technically difficult.        Medications     Continuing ACE inhibitor/ARB/ARNI from home medication list OR ACE inhibitor/ARB order already placed during this visit       Continuing ACE inhibitor/ARB/ARNI from home medication list OR ACE  inhibitor/ARB order already placed during this visit       - MEDICATION INSTRUCTIONS -       - MEDICATION INSTRUCTIONS -       - MEDICATION INSTRUCTIONS -       - MEDICATION INSTRUCTIONS -       - MEDICATION INSTRUCTIONS -       Percutaneous Coronary Intervention orders placed (this is information for BPA alerting)       BETA BLOCKER NOT PRESCRIBED         aspirin  81 mg Oral Daily     atorvastatin  80 mg Oral Daily     carvedilol  3.125 mg Oral BID w/meals     lisinopril  10 mg Oral Daily     sodium chloride (PF)  3 mL Intracatheter Q8H     ticagrelor  90 mg Oral Q12H

## 2019-03-07 NOTE — PLAN OF CARE
ICU End of Shift Summary.  For vital signs and complete assessments, please see documentation flowsheets.     Pertinent assessments: A&O. VSS. Afebrile. Denies pain. Up to bathroom with SBA. LS CL on RA. Tele SR- SB (57-70s). Slept well.   Major Shift Events: Angio site WNL; no signs of hematoma, bruising, or redness. Discussed importance of taking medications as prescribed and finding a pharmacy close to home for ease of obtaining refills as patient stated that getting refills in a timely manner has affected him taking pills regularly.   Plan (Upcoming Events): Discharge today   Discharge/Transfer Needs: Needs MD to sign letter for employer    Bedside Shift Report Completed :   Bedside Safety Check Completed:

## 2019-03-08 ENCOUNTER — CARE COORDINATION (OUTPATIENT)
Dept: CARDIOLOGY | Facility: CLINIC | Age: 54
End: 2019-03-08

## 2019-03-08 NOTE — PROGRESS NOTES
Patient was evaluated by cardiology while inpatient for chest pain.NSTEMI, sp PCI LAD/D3, Old AWMI stent to LAD/M1. Called patient to discuss any post hospital d/c questions, review medication changes, and confirm f/u appts.     RN left a message to call RN back.     3/11/19 Called pt again and left a voicemail message to call RN back.

## 2019-03-12 ENCOUNTER — HOSPITAL ENCOUNTER (OUTPATIENT)
Dept: CARDIAC REHAB | Facility: CLINIC | Age: 54
End: 2019-03-12
Attending: INTERNAL MEDICINE
Payer: COMMERCIAL

## 2019-03-12 DIAGNOSIS — I24.9 ACS (ACUTE CORONARY SYNDROME) (H): ICD-10-CM

## 2019-03-12 PROCEDURE — 40000116 ZZH STATISTIC OP CR VISIT: Performed by: OCCUPATIONAL THERAPIST

## 2019-03-12 PROCEDURE — 40000575 ZZH STATISTIC OP CARDIAC VISIT #2: Performed by: OCCUPATIONAL THERAPIST

## 2019-03-12 PROCEDURE — 93798 PHYS/QHP OP CAR RHAB W/ECG: CPT | Performed by: OCCUPATIONAL THERAPIST

## 2019-03-12 PROCEDURE — 93797 PHYS/QHP OP CAR RHAB WO ECG: CPT | Performed by: OCCUPATIONAL THERAPIST

## 2019-03-13 ENCOUNTER — HOSPITAL ENCOUNTER (OUTPATIENT)
Dept: CARDIAC REHAB | Facility: CLINIC | Age: 54
End: 2019-03-13
Attending: INTERNAL MEDICINE
Payer: COMMERCIAL

## 2019-03-13 PROCEDURE — 93798 PHYS/QHP OP CAR RHAB W/ECG: CPT | Performed by: REHABILITATION PRACTITIONER

## 2019-03-13 PROCEDURE — 40000116 ZZH STATISTIC OP CR VISIT: Performed by: REHABILITATION PRACTITIONER

## 2019-03-18 ENCOUNTER — HOSPITAL ENCOUNTER (OUTPATIENT)
Dept: CARDIAC REHAB | Facility: CLINIC | Age: 54
End: 2019-03-18
Attending: INTERNAL MEDICINE
Payer: COMMERCIAL

## 2019-03-18 ENCOUNTER — OFFICE VISIT (OUTPATIENT)
Dept: CARDIOLOGY | Facility: CLINIC | Age: 54
End: 2019-03-18
Attending: PHYSICIAN ASSISTANT
Payer: COMMERCIAL

## 2019-03-18 VITALS
SYSTOLIC BLOOD PRESSURE: 112 MMHG | HEART RATE: 61 BPM | DIASTOLIC BLOOD PRESSURE: 80 MMHG | BODY MASS INDEX: 26.36 KG/M2 | WEIGHT: 178 LBS | OXYGEN SATURATION: 98 % | HEIGHT: 69 IN

## 2019-03-18 DIAGNOSIS — I25.10 CORONARY ARTERY DISEASE INVOLVING NATIVE CORONARY ARTERY OF NATIVE HEART WITHOUT ANGINA PECTORIS: ICD-10-CM

## 2019-03-18 DIAGNOSIS — I21.4 NSTEMI (NON-ST ELEVATED MYOCARDIAL INFARCTION) (H): ICD-10-CM

## 2019-03-18 DIAGNOSIS — E78.5 HYPERLIPIDEMIA LDL GOAL <70: Primary | ICD-10-CM

## 2019-03-18 PROCEDURE — 99214 OFFICE O/P EST MOD 30 MIN: CPT | Performed by: NURSE PRACTITIONER

## 2019-03-18 PROCEDURE — 40000116 ZZH STATISTIC OP CR VISIT

## 2019-03-18 PROCEDURE — 93798 PHYS/QHP OP CAR RHAB W/ECG: CPT

## 2019-03-18 ASSESSMENT — MIFFLIN-ST. JEOR: SCORE: 1647.78

## 2019-03-18 NOTE — LETTER
3/18/2019    Dorys Vanessa PA-C  [address]  [facility] [address]    RE: Viviana Gilman       Dear Colleague,    I had the pleasure of seeing Viviana Gilman in the [facility].    Cardiology Clinic Progress Note  Viviana Gilman MRN# [id number]   YOB: 1965 Age: 53 year old     Reason For Visit:  Hospital discharge follow up    Primary Cardiologist:   Dr. Fleming          History of Presenting Illness:    Viviana Gilman is a pleasant 53 year old patient who carries a past medical history significant for CAD s/p stents, HTN, and HLD.     On 3/4/19, he ws admitted to [facility] with exertional chest pain with radiation to the left arm and shortness of breath. She was found to have a NSTEMI. Troponin peaked at 0.16. He underwent coronary angiogram and subsequent stenting the LAD, distal to prior stent using a 3.0 x 38 mm Synergy COURTNEY. Residual disease remain in a small diagonal vessel and 30-35% proximal circumflex. Follow up echocardiogram demonstrated a normal LV 55-60%, small area of severe apical hypokinesis, and mildly dilated ascending aorta. Post procedure he did develop right femoral swelling.  CT was negative for pseudoaneurysm, AV fistula, or hematoma. He comes to the office today for follow up .      He is feeling well on a cardiac standpoint with the exception of mild exertional chest tightness that quickly resolves with rest. He denies chest pain, shortness of breath, PND, orthopnea, presyncope, syncope, edema, heart racing, or palpitations. He reports only small residual bruising at his right groin access site.     Blood pressure is well controlled at 112/80, managed on lisinopril and low dose carvedilol.   BMP showed a sodium of 137, potassium 3.6, BUN 12, Creatinine 0.76, and GFR > 90  BMI 26.29    Lipids demonstrate a total cholesterol of 186, HDL 47, , and Triglycerides 110. He was recently initiated on high dose statin. He admits his did consists of lean meats,  fruits and vegetable, but high in carbs as he eats large amounts of rice.     He is enrolled in cardiac rehab and plans to return back to work this week.   Remains compliant with all medications.                   Assessment and Plan:     1. NSTEMI - s/p 3.0 x 38 mm Synergy COURTNEY LAD, remote stent to LAD and circumflex.  Well preserved EF 55-60%. Brilinta x 12 months, aspirin ( lifelong), carvedilol, lisinopril, and high dose statin. Offered low dose isosorbide for reported episodes of chest pain which he has declined at this time. Due to borderline HR I did not uptitrate his BB.  He requests to see if the symptoms resolve when he returns to work. He has been instructed to monitor frequency and duration of chest discomfort and notify the office with any worsening symptoms or seek ER evaluation if pain is severe. Continue with cardiac rehab.   2. Hypertension - Well controlled. Continue on current therapy.   3. Hyperlipidemia - LDL not at goal. Follow up FLP in 3 months prior to Dr. Fleming appointment. Continue on high dose statin.                 Thank you for allowing me to participate in this delightful patient's care. I have recommended him to follow up in 3 months with Dr. Fleming .       Bernadette Palmer, APRN, CNP          Review of Systems:   Review of Systems:  Skin:  Positive for bruising   Eyes:  Positive for glasses  ENT:  Negative    Respiratory:  Positive for dyspnea on exertion  Cardiovascular:    lightheadedness;Positive for;chest pain  Gastroenterology: Negative    Genitourinary:  Negative    Musculoskeletal:  Positive for back pain;arthritis;joint pain  Neurologic:  Negative    Psychiatric:  Negative    Heme/Lymph/Imm:  Positive for easy bruising  Endocrine:  Negative                Physical Exam:     GEN:  In general, this is a well nourished male in no acute distress.  HEENT:  Pupils equal, round. Sclerae nonicteric. Clear oropharynx. Mucous membranes moist.  NECK: Supple, no masses appreciated. Trachea  midline. No JVD   C/V:  Regular rate and rhythm, No murmur, rub or gallop. No S3 or RV heave.   RESP: Respirations are unlabored. No use of accessory muscles. Clear to auscultation bilaterally without wheezing, rales, or rhonchi.  GI: Abdomen soft, nontender, nondistended. No HSM appreciated.   EXTREM: No LE edema. No cyanosis or clubbing.  NEURO: Alert and oriented, cooperative. No obvious focal deficits.   PSYCH: Normal affect.  SKIN: Warm and dry. No rashes or petechiae appreciated.          Past Medical History:     Past Medical History:   Diagnosis Date     Coronary artery disease      History of blood transfusion 1987    ?pt thinks he may have had an Ulcer     History of coronary angiogram Sept 2011    stent LAD     Hypertension      Malaria 1978    [place]     Mixed hyperlipidemia     Hyperlipidemia     Old myocardial infarction     Myocardial Infarction              Past Surgical History:     Past Surgical History:   Procedure Laterality Date     CARDIAC CATHERIZATION  09/28/2011     Successful aspiration thrombectomy and stenting of a 90% thrombotic stenosis in the mid LAD, placing a 3.0 x 23 mm Promus COURTNEY      CARDIAC CATHERIZATION  10/25/2011    PTCA and implantation of 2.75 x 12 mm length everolimus-eluting PROMUS stent in the midsegment of the first OM branch of circ     CV HEART CATHETERIZATION WITH POSSIBLE INTERVENTION Left 3/5/2019    Successful stenting of a partially in-stent restenosis of 70% in a stent placed in 2011 plus a new 99% LAD lesion which was the infarct vessel taking these down to 0-5%. Successful rescue of a diagonal 2 with balloon angioplasty and final kissing balloon with residual narrowing of the ostium still 50-70% but it is a small vessel.      PROCEDURE PLACEHOLDER ORTHO      L knee ACL repair              Allergies:   Patient has no known allergies.       Data:   All laboratory data reviewed:    LAST CHOLESTEROL:  Lab Results   Component  Value Date    CHOL 186 03/05/2019     Lab Results   Component Value Date    HDL 47 03/05/2019     Lab Results   Component Value Date     03/05/2019     Lab Results   Component Value Date    TRIG 110 03/05/2019     Lab Results   Component Value Date    CHOLHDLRATIO 3.4 11/20/2012       LAST BMP:  Lab Results   Component Value Date     03/06/2019      Lab Results   Component Value Date    POTASSIUM 3.6 03/06/2019     Lab Results   Component Value Date    CHLORIDE 108 03/06/2019     Lab Results   Component Value Date    JAY 8.4 03/06/2019     Lab Results   Component Value Date    CO2 22 03/06/2019     Lab Results   Component Value Date    BUN 12 03/06/2019     Lab Results   Component Value Date    CR 0.76 03/06/2019     Lab Results   Component Value Date    GLC 92 03/06/2019       LAST CBC:  Lab Results   Component Value Date    WBC 8.6 03/06/2019     Lab Results   Component Value Date    RBC 5.17 03/06/2019     Lab Results   Component Value Date    HGB 14.2 03/06/2019     Lab Results   Component Value Date    HCT 43.3 03/06/2019     Lab Results   Component Value Date    MCV 84 03/06/2019     Lab Results   Component Value Date    MCH 26.7 03/06/2019     Lab Results   Component Value Date    MCHC 31.9 03/06/2019     Lab Results   Component Value Date    RDW 14.3 03/06/2019     Lab Results   Component Value Date     03/06/2019         Thank you for allowing me to participate in the care of your patient.    Sincerely,     JEFF Guerra CNP     [facility]

## 2019-03-18 NOTE — PROGRESS NOTES
Cardiology Clinic Progress Note  Viviana Gilman MRN# [id number]   YOB: 1965 Age: 53 year old     Reason For Visit:  Hospital discharge follow up    Primary Cardiologist:   Dr. Fleming          History of Presenting Illness:    Viviana Gilman is a pleasant 53 year old patient who carries a past medical history significant for CAD s/p stents, HTN, and HLD.     On 3/4/19, he ws admitted to [facility] with exertional chest pain with radiation to the left arm and shortness of breath. She was found to have a NSTEMI. Troponin peaked at 0.16. He underwent coronary angiogram and subsequent stenting the LAD, distal to prior stent using a 3.0 x 38 mm Synergy COURTNEY. Residual disease remain in a small diagonal vessel and 30-35% proximal circumflex. Follow up echocardiogram demonstrated a normal LV 55-60%, small area of severe apical hypokinesis, and mildly dilated ascending aorta. Post procedure he did develop right femoral swelling.  CT was negative for pseudoaneurysm, AV fistula, or hematoma. He comes to the office today for follow up .      He is feeling well on a cardiac standpoint with the exception of mild exertional chest tightness that quickly resolves with rest. He denies chest pain, shortness of breath, PND, orthopnea, presyncope, syncope, edema, heart racing, or palpitations. He reports only small residual bruising at his right groin access site.     Blood pressure is well controlled at 112/80, managed on lisinopril and low dose carvedilol.   BMP showed a sodium of 137, potassium 3.6, BUN 12, Creatinine 0.76, and GFR > 90  BMI 26.29    Lipids demonstrate a total cholesterol of 186, HDL 47, , and Triglycerides 110. He was recently initiated on high dose statin. He admits his did consists of lean meats, fruits and vegetable, but high in carbs as he eats large amounts of rice.     He is enrolled in cardiac rehab and plans to return back to work this week.   Remains compliant with all medications.                    Assessment and Plan:     1. NSTEMI - s/p 3.0 x 38 mm Synergy COURTNEY LAD, remote stent to LAD and circumflex.  Well preserved EF 55-60%. Brilinta x 12 months, aspirin ( lifelong), carvedilol, lisinopril, and high dose statin. Offered low dose isosorbide for reported episodes of chest pain which he has declined at this time. Due to borderline HR I did not uptitrate his BB.  He requests to see if the symptoms resolve when he returns to work. He has been instructed to monitor frequency and duration of chest discomfort and notify the office with any worsening symptoms or seek ER evaluation if pain is severe. Continue with cardiac rehab.   2. Hypertension - Well controlled. Continue on current therapy.   3. Hyperlipidemia - LDL not at goal. Follow up FLP in 3 months prior to Dr. Fleming appointment. Continue on high dose statin.                 Thank you for allowing me to participate in this delightful patient's care. I have recommended him to follow up in 3 months with Dr. lFeming .       Bernadette Palmer, APRN, CNP          Review of Systems:   Review of Systems:  Skin:  Positive for bruising   Eyes:  Positive for glasses  ENT:  Negative    Respiratory:  Positive for dyspnea on exertion  Cardiovascular:    lightheadedness;Positive for;chest pain  Gastroenterology: Negative    Genitourinary:  Negative    Musculoskeletal:  Positive for back pain;arthritis;joint pain  Neurologic:  Negative    Psychiatric:  Negative    Heme/Lymph/Imm:  Positive for easy bruising  Endocrine:  Negative                Physical Exam:     GEN:  In general, this is a well nourished male in no acute distress.  HEENT:  Pupils equal, round. Sclerae nonicteric. Clear oropharynx. Mucous membranes moist.  NECK: Supple, no masses appreciated. Trachea midline. No JVD   C/V:  Regular rate and rhythm, No murmur, rub or gallop. No S3 or RV heave.   RESP: Respirations are unlabored. No use of accessory muscles. Clear to auscultation bilaterally without wheezing,  rales, or rhonchi.  GI: Abdomen soft, nontender, nondistended. No HSM appreciated.   EXTREM: No LE edema. No cyanosis or clubbing.  NEURO: Alert and oriented, cooperative. No obvious focal deficits.   PSYCH: Normal affect.  SKIN: Warm and dry. No rashes or petechiae appreciated.          Past Medical History:     Past Medical History:   Diagnosis Date     Coronary artery disease      History of blood transfusion 1987    ?pt thinks he may have had an Ulcer     History of coronary angiogram Sept 2011    stent LAD     Hypertension      Malaria 1978    [place]     Mixed hyperlipidemia     Hyperlipidemia     Old myocardial infarction     Myocardial Infarction              Past Surgical History:     Past Surgical History:   Procedure Laterality Date     CARDIAC CATHERIZATION  09/28/2011     Successful aspiration thrombectomy and stenting of a 90% thrombotic stenosis in the mid LAD, placing a 3.0 x 23 mm Promus COURTNEY      CARDIAC CATHERIZATION  10/25/2011    PTCA and implantation of 2.75 x 12 mm length everolimus-eluting PROMUS stent in the midsegment of the first OM branch of circ     CV HEART CATHETERIZATION WITH POSSIBLE INTERVENTION Left 3/5/2019    Successful stenting of a partially in-stent restenosis of 70% in a stent placed in 2011 plus a new 99% LAD lesion which was the infarct vessel taking these down to 0-5%. Successful rescue of a diagonal 2 with balloon angioplasty and final kissing balloon with residual narrowing of the ostium still 50-70% but it is a small vessel.      PROCEDURE PLACEHOLDER ORTHO      L knee ACL repair              Allergies:   Patient has no known allergies.       Data:   All laboratory data reviewed:    LAST CHOLESTEROL:  Lab Results   Component Value Date    CHOL 186 03/05/2019     Lab Results   Component Value Date    HDL 47 03/05/2019     Lab Results   Component Value Date     03/05/2019     Lab Results   Component Value Date    TRIG 110  03/05/2019     Lab Results   Component Value Date    CHOLHDLRATIO 3.4 11/20/2012       LAST BMP:  Lab Results   Component Value Date     03/06/2019      Lab Results   Component Value Date    POTASSIUM 3.6 03/06/2019     Lab Results   Component Value Date    CHLORIDE 108 03/06/2019     Lab Results   Component Value Date    JAY 8.4 03/06/2019     Lab Results   Component Value Date    CO2 22 03/06/2019     Lab Results   Component Value Date    BUN 12 03/06/2019     Lab Results   Component Value Date    CR 0.76 03/06/2019     Lab Results   Component Value Date    GLC 92 03/06/2019       LAST CBC:  Lab Results   Component Value Date    WBC 8.6 03/06/2019     Lab Results   Component Value Date    RBC 5.17 03/06/2019     Lab Results   Component Value Date    HGB 14.2 03/06/2019     Lab Results   Component Value Date    HCT 43.3 03/06/2019     Lab Results   Component Value Date    MCV 84 03/06/2019     Lab Results   Component Value Date    MCH 26.7 03/06/2019     Lab Results   Component Value Date    MCHC 31.9 03/06/2019     Lab Results   Component Value Date    RDW 14.3 03/06/2019     Lab Results   Component Value Date     03/06/2019

## 2019-03-18 NOTE — PATIENT INSTRUCTIONS
Thanks for coming into [facility] today.    Doing well on a cardiac standpoint  Continue on current medical therapy  Continue with cardiac rehab  Follow up in 3 months with Dr. Fleming with labs prior.     Please call my nurse at [phone] with any questions or concerns.    Scheduling phone number: [phone]  Reminder: Please bring in all current medications, over the counter supplements and vitamin bottles to your next appointment.

## 2019-03-20 ENCOUNTER — HOSPITAL ENCOUNTER (OUTPATIENT)
Dept: CARDIAC REHAB | Facility: CLINIC | Age: 54
End: 2019-03-20
Attending: INTERNAL MEDICINE
Payer: COMMERCIAL

## 2019-03-20 PROCEDURE — 93798 PHYS/QHP OP CAR RHAB W/ECG: CPT

## 2019-03-20 PROCEDURE — 40000116 ZZH STATISTIC OP CR VISIT

## 2019-03-25 ENCOUNTER — HOSPITAL ENCOUNTER (OUTPATIENT)
Dept: CARDIAC REHAB | Facility: CLINIC | Age: 54
End: 2019-03-25
Attending: INTERNAL MEDICINE
Payer: COMMERCIAL

## 2019-03-25 PROCEDURE — 93798 PHYS/QHP OP CAR RHAB W/ECG: CPT | Performed by: OCCUPATIONAL THERAPIST

## 2019-03-25 PROCEDURE — 40000116 ZZH STATISTIC OP CR VISIT: Performed by: OCCUPATIONAL THERAPIST

## 2019-03-27 ENCOUNTER — HOSPITAL ENCOUNTER (OUTPATIENT)
Dept: CARDIAC REHAB | Facility: CLINIC | Age: 54
End: 2019-03-27
Attending: INTERNAL MEDICINE
Payer: COMMERCIAL

## 2019-03-27 PROCEDURE — 93798 PHYS/QHP OP CAR RHAB W/ECG: CPT | Performed by: OCCUPATIONAL THERAPIST

## 2019-03-27 PROCEDURE — 40000116 ZZH STATISTIC OP CR VISIT: Performed by: OCCUPATIONAL THERAPIST

## 2019-04-01 ENCOUNTER — HOSPITAL ENCOUNTER (OUTPATIENT)
Dept: CARDIAC REHAB | Facility: CLINIC | Age: 54
End: 2019-04-01
Attending: INTERNAL MEDICINE
Payer: COMMERCIAL

## 2019-04-01 PROCEDURE — 40000116 ZZH STATISTIC OP CR VISIT: Performed by: OCCUPATIONAL THERAPIST

## 2019-04-01 PROCEDURE — 93798 PHYS/QHP OP CAR RHAB W/ECG: CPT | Performed by: OCCUPATIONAL THERAPIST

## 2019-04-03 ENCOUNTER — HOSPITAL ENCOUNTER (OUTPATIENT)
Dept: CARDIAC REHAB | Facility: CLINIC | Age: 54
End: 2019-04-03
Attending: INTERNAL MEDICINE
Payer: COMMERCIAL

## 2019-04-03 DIAGNOSIS — I10 ESSENTIAL HYPERTENSION: ICD-10-CM

## 2019-04-03 DIAGNOSIS — I25.10 CORONARY ARTERY DISEASE INVOLVING NATIVE CORONARY ARTERY OF NATIVE HEART WITHOUT ANGINA PECTORIS: ICD-10-CM

## 2019-04-03 DIAGNOSIS — I25.2 OLD MYOCARDIAL INFARCTION: ICD-10-CM

## 2019-04-03 PROCEDURE — 40000575 ZZH STATISTIC OP CARDIAC VISIT #2: Performed by: OCCUPATIONAL THERAPIST

## 2019-04-03 PROCEDURE — 40000116 ZZH STATISTIC OP CR VISIT: Performed by: OCCUPATIONAL THERAPIST

## 2019-04-03 PROCEDURE — 93797 PHYS/QHP OP CAR RHAB WO ECG: CPT | Performed by: OCCUPATIONAL THERAPIST

## 2019-04-03 PROCEDURE — 93798 PHYS/QHP OP CAR RHAB W/ECG: CPT | Performed by: OCCUPATIONAL THERAPIST

## 2019-04-03 RX ORDER — NITROGLYCERIN 0.4 MG/1
TABLET SUBLINGUAL
Qty: 15 TABLET | Refills: 0 | Status: SHIPPED | OUTPATIENT
Start: 2019-04-03 | End: 2019-04-03

## 2019-04-03 RX ORDER — NITROGLYCERIN 0.4 MG/1
TABLET SUBLINGUAL
Qty: 25 TABLET | Refills: 1 | Status: SHIPPED | OUTPATIENT
Start: 2019-04-03

## 2019-04-03 RX ORDER — CARVEDILOL 3.12 MG/1
3.12 TABLET ORAL 2 TIMES DAILY WITH MEALS
Qty: 180 TABLET | Refills: 0 | Status: SHIPPED | OUTPATIENT
Start: 2019-04-03 | End: 2019-06-19

## 2019-04-03 RX ORDER — LISINOPRIL 10 MG/1
10 TABLET ORAL DAILY
Qty: 90 TABLET | Refills: 1 | Status: SHIPPED | OUTPATIENT
Start: 2019-04-03 | End: 2019-10-01

## 2019-04-03 RX ORDER — ASPIRIN 81 MG/1
81 TABLET ORAL DAILY
Qty: 90 TABLET | Refills: 1 | Status: SHIPPED | OUTPATIENT
Start: 2019-04-03 | End: 2019-10-01

## 2019-04-03 RX ORDER — ATORVASTATIN CALCIUM 80 MG/1
80 TABLET, FILM COATED ORAL DAILY
Qty: 90 TABLET | Refills: 1 | Status: SHIPPED | OUTPATIENT
Start: 2019-04-03 | End: 2019-10-01

## 2019-04-03 NOTE — TELEPHONE ENCOUNTER
Viviana Nieves was discharged from [facility] ICU unit on 3/6/19 and looks like he had his follow up appointment with you at [facility]. He is needing refills of his medications he was discharged on. If this is appropriate please send new RX's to the [facility] pharmacy.    Thank you,  [employer] [facility] [employer] [facility] Staff Technician   [facility]  [email or web address]   Ph#: [phone]   Fax#: [phone]

## 2019-04-08 ENCOUNTER — HOSPITAL ENCOUNTER (OUTPATIENT)
Dept: CARDIAC REHAB | Facility: CLINIC | Age: 54
End: 2019-04-08
Attending: INTERNAL MEDICINE
Payer: COMMERCIAL

## 2019-04-08 PROCEDURE — 93798 PHYS/QHP OP CAR RHAB W/ECG: CPT | Performed by: CLINICAL EXERCISE PHYSIOLOGIST

## 2019-04-08 PROCEDURE — 40000116 ZZH STATISTIC OP CR VISIT: Performed by: CLINICAL EXERCISE PHYSIOLOGIST

## 2019-04-10 ENCOUNTER — HOSPITAL ENCOUNTER (OUTPATIENT)
Dept: CARDIAC REHAB | Facility: CLINIC | Age: 54
End: 2019-04-10
Attending: INTERNAL MEDICINE
Payer: COMMERCIAL

## 2019-04-10 PROCEDURE — 40000116 ZZH STATISTIC OP CR VISIT: Performed by: OCCUPATIONAL THERAPIST

## 2019-04-10 PROCEDURE — 93798 PHYS/QHP OP CAR RHAB W/ECG: CPT | Performed by: OCCUPATIONAL THERAPIST

## 2019-04-15 ENCOUNTER — HOSPITAL ENCOUNTER (OUTPATIENT)
Dept: CARDIAC REHAB | Facility: CLINIC | Age: 54
End: 2019-04-15
Attending: INTERNAL MEDICINE
Payer: COMMERCIAL

## 2019-04-15 PROCEDURE — 40000116 ZZH STATISTIC OP CR VISIT

## 2019-04-15 PROCEDURE — 93798 PHYS/QHP OP CAR RHAB W/ECG: CPT

## 2019-04-17 ENCOUNTER — HOSPITAL ENCOUNTER (OUTPATIENT)
Dept: CARDIAC REHAB | Facility: CLINIC | Age: 54
End: 2019-04-17
Attending: INTERNAL MEDICINE
Payer: COMMERCIAL

## 2019-04-17 PROCEDURE — 93798 PHYS/QHP OP CAR RHAB W/ECG: CPT | Performed by: REHABILITATION PRACTITIONER

## 2019-04-17 PROCEDURE — 40000116 ZZH STATISTIC OP CR VISIT: Performed by: REHABILITATION PRACTITIONER

## 2019-04-22 ENCOUNTER — HOSPITAL ENCOUNTER (OUTPATIENT)
Dept: CARDIAC REHAB | Facility: CLINIC | Age: 54
End: 2019-04-22
Attending: INTERNAL MEDICINE
Payer: COMMERCIAL

## 2019-04-22 PROCEDURE — 93798 PHYS/QHP OP CAR RHAB W/ECG: CPT

## 2019-04-22 PROCEDURE — 40000116 ZZH STATISTIC OP CR VISIT

## 2019-04-29 ENCOUNTER — HOSPITAL ENCOUNTER (OUTPATIENT)
Dept: CARDIAC REHAB | Facility: CLINIC | Age: 54
End: 2019-04-29
Attending: INTERNAL MEDICINE
Payer: COMMERCIAL

## 2019-04-29 PROCEDURE — 40000116 ZZH STATISTIC OP CR VISIT

## 2019-04-29 PROCEDURE — 93798 PHYS/QHP OP CAR RHAB W/ECG: CPT

## 2019-05-01 ENCOUNTER — HOSPITAL ENCOUNTER (OUTPATIENT)
Dept: CARDIAC REHAB | Facility: CLINIC | Age: 54
End: 2019-05-01
Attending: INTERNAL MEDICINE
Payer: COMMERCIAL

## 2019-05-01 PROCEDURE — 40000116 ZZH STATISTIC OP CR VISIT: Performed by: REHABILITATION PRACTITIONER

## 2019-05-01 PROCEDURE — 93798 PHYS/QHP OP CAR RHAB W/ECG: CPT | Performed by: REHABILITATION PRACTITIONER

## 2019-05-06 ENCOUNTER — HOSPITAL ENCOUNTER (OUTPATIENT)
Dept: CARDIAC REHAB | Facility: CLINIC | Age: 54
End: 2019-05-06
Attending: INTERNAL MEDICINE
Payer: COMMERCIAL

## 2019-05-06 PROCEDURE — 93798 PHYS/QHP OP CAR RHAB W/ECG: CPT

## 2019-05-06 PROCEDURE — 40000116 ZZH STATISTIC OP CR VISIT

## 2019-06-04 NOTE — ADDENDUM NOTE
Encounter addended by: Gerald Watt EP on: 6/4/2019 2:00 PM   Actions taken: Sign clinical note, Episode resolved

## 2019-06-04 NOTE — PROGRESS NOTES
Cardiac Rehab Discharge Summary  Viviana Gilman  53 year old  NSTEMI, Stents    Reason for discharge:    Patient/family request discontinuation of services.    Progress towards goals:  Goals met    Recommendation(s):    Continue home exercise program.     Physician cosignature/electronic signature indicates agreements with the ITP document and approval of discharge.

## 2019-06-05 ENCOUNTER — OFFICE VISIT (OUTPATIENT)
Dept: FAMILY MEDICINE | Facility: CLINIC | Age: 54
End: 2019-06-05
Payer: COMMERCIAL

## 2019-06-05 VITALS
HEART RATE: 63 BPM | WEIGHT: 176 LBS | OXYGEN SATURATION: 98 % | TEMPERATURE: 97.9 F | BODY MASS INDEX: 26.07 KG/M2 | DIASTOLIC BLOOD PRESSURE: 60 MMHG | SYSTOLIC BLOOD PRESSURE: 100 MMHG | HEIGHT: 69 IN

## 2019-06-05 DIAGNOSIS — E66.3 OVERWEIGHT (BMI 25.0-29.9): ICD-10-CM

## 2019-06-05 DIAGNOSIS — Z12.11 SPECIAL SCREENING FOR MALIGNANT NEOPLASMS, COLON: ICD-10-CM

## 2019-06-05 DIAGNOSIS — Z13.1 SCREENING FOR DIABETES MELLITUS: ICD-10-CM

## 2019-06-05 DIAGNOSIS — Z00.01 ENCOUNTER FOR ROUTINE ADULT MEDICAL EXAM WITH ABNORMAL FINDINGS: Primary | ICD-10-CM

## 2019-06-05 DIAGNOSIS — Z12.5 SCREENING FOR PROSTATE CANCER: ICD-10-CM

## 2019-06-05 DIAGNOSIS — Z11.4 ENCOUNTER FOR SCREENING FOR HIV: ICD-10-CM

## 2019-06-05 DIAGNOSIS — I25.10 CORONARY ARTERY DISEASE INVOLVING NATIVE CORONARY ARTERY OF NATIVE HEART WITHOUT ANGINA PECTORIS: ICD-10-CM

## 2019-06-05 DIAGNOSIS — E78.5 HYPERLIPIDEMIA LDL GOAL <70: ICD-10-CM

## 2019-06-05 PROCEDURE — 99396 PREV VISIT EST AGE 40-64: CPT | Performed by: PHYSICIAN ASSISTANT

## 2019-06-05 ASSESSMENT — MIFFLIN-ST. JEOR: SCORE: 1638.71

## 2019-06-05 NOTE — PROGRESS NOTES
SUBJECTIVE:   CC: Viviana Gliman is an 53 year old male who presents for preventive health visit.     Healthy Habits:    Do you get at least three servings of calcium containing foods daily (dairy, green leafy vegetables, etc.)? no, taking calcium and/or vitamin D supplement: no    Amount of exercise or daily activities, outside of work: 2 day(s) per week    Problems taking medications regularly No    Medication side effects: No    Have you had an eye exam in the past two years? no    Do you see a dentist twice per year? yes    Do you have sleep apnea, excessive snoring or daytime drowsiness?no    CAD - followed by cardiology. Had NSTEMI in March and had another stent placed and is on brilinta. He completed cardiac rehab and has been doing well since that time. Reviewed last cardiology note and pt was to return in 3 months to recheck and ensure optimization of his lipid levels. High intensity statin therapy with lipitor and BP has been in good control. Pt is not fasting today, but has future orders from his cardiologist to complete labs prior to his 3 month check. He will schedule this.       Today's PHQ-2 Score:   PHQ-2 ( 1999 Pf[employer]) 6/5/2019 2/8/2017   Q1: Little interest or pleasure in doing things 0 0   Q2: Feeling down, depressed or hopeless 0 0   PHQ-2 Score 0 0       Abuse: Current or Past(Physical, Sexual or Emotional)- No  Do you feel safe in your environment? Yes    Social History     Tobacco Use     Smoking status: Never Smoker     Smokeless tobacco: Never Used   Substance Use Topics     Alcohol use: Yes     Alcohol/week: 0.0 oz     Comment: Occaisionally     If you drink alcohol do you typically have >3 drinks per day or >7 drinks per week? No                      Last PSA: No results found for: PSA    Reviewed orders with patient. Reviewed health maintenance and updated orders accordingly - Yes  BP Readings from Last 3 Encounters:   06/05/19 100/60   03/18/19 112/80   03/07/19 118/83    Wt Readings from  Last 3 Encounters:   06/05/19 79.8 kg (176 lb)   03/18/19 80.7 kg (178 lb)   03/05/19 78.7 kg (173 lb 6.4 oz)                  Patient Active Problem List   Diagnosis     Coronary artery disease involving native coronary artery without angina pectoris     Hyperlipidemia LDL goal <70     History of coronary angiogram     Hypertension goal BP (blood pressure) < 140/90     Old myocardial infarction     ACS (acute coronary syndrome) (H)     Overweight (BMI 25.0-29.9)     Past Surgical History:   Procedure Laterality Date     CARDIAC CATHERIZATION  09/28/2011     Successful aspiration thrombectomy and stenting of a 90% thrombotic stenosis in the mid LAD, placing a 3.0 x 23 mm Promus COURTNEY      CARDIAC CATHERIZATION  10/25/2011    PTCA and implantation of 2.75 x 12 mm length everolimus-eluting PROMUS stent in the midsegment of the first OM branch of circ     CV HEART CATHETERIZATION WITH POSSIBLE INTERVENTION Left 3/5/2019    Successful stenting of a partially in-stent restenosis of 70% in a stent placed in 2011 plus a new 99% LAD lesion which was the infarct vessel taking these down to 0-5%. Successful rescue of a diagonal 2 with balloon angioplasty and final kissing balloon with residual narrowing of the ostium still 50-70% but it is a small vessel.      PROCEDURE PLACEHOLDER ORTHO      L knee ACL repair       Social History     Tobacco Use     Smoking status: Never Smoker     Smokeless tobacco: Never Used   Substance Use Topics     Alcohol use: Yes     Alcohol/week: 0.0 oz     Comment: Occaisionally     Family History   Problem Relation Age of Onset     Diabetes Mother         type 2     Hypertension Mother      Hyperlipidemia Mother      Peptic Ulcer Disease Father         When first moved to the [facility]     Coronary Artery Disease No family hx of      Cerebrovascular Disease No family hx of      Colon Cancer No family hx of      Prostate Cancer No family hx of      Thyroid Disease No family hx of      Genetic Disorder No  "family hx of      Breast Cancer No family hx of          Current Outpatient Medications   Medication Sig Dispense Refill     aspirin 81 MG EC tablet Take 1 tablet (81 mg) by mouth daily 90 tablet 1     atorvastatin (LIPITOR) 80 MG tablet Take 1 tablet (80 mg) by mouth daily 90 tablet 1     carvedilol (COREG) 3.125 MG tablet Take 1 tablet (3.125 mg) by mouth 2 times daily (with meals) 180 tablet 0     lisinopril (PRINIVIL/ZESTRIL) 10 MG tablet Take 1 tablet (10 mg) by mouth daily 90 tablet 1     nitroGLYcerin (NITROSTAT) 0.4 MG sublingual tablet For chest pain place 1 tablet under the tongue repeat every 5 minutes for 3 doses if needed. 25 tablet 1     ticagrelor (BRILINTA) 90 MG tablet Take 1 tablet (90 mg) by mouth every 12 hours 180 tablet 1     No Known Allergies    Reviewed and updated as needed this visit by clinical staff  Tobacco  Allergies  Meds  Med Hx  Surg Hx  Fam Hx  Soc Hx        Reviewed and updated as needed this visit by Provider  Tobacco  Allergies  Meds  Med Hx  Surg Hx  Fam Hx  Soc Hx           ROS:  CONSTITUTIONAL: NEGATIVE for fever, chills, change in weight  INTEGUMENTARY/SKIN: NEGATIVE for worrisome rashes, moles or lesions  EYES: NEGATIVE for vision changes or irritation  ENT: NEGATIVE for ear, mouth and throat problems  RESP: NEGATIVE for significant cough or SOB  CV: NEGATIVE for chest pain, palpitations or peripheral edema  GI: NEGATIVE for nausea, abdominal pain, heartburn, or change in bowel habits   male: negative for dysuria, hematuria, decreased urinary stream, erectile dysfunction, urethral discharge  MUSCULOSKELETAL: NEGATIVE for significant arthralgias or myalgia  NEURO: NEGATIVE for weakness, dizziness or paresthesias  PSYCHIATRIC: NEGATIVE for changes in mood or affect    OBJECTIVE:   /60 (BP Location: Right arm, Cuff Size: Adult Regular)   Pulse 63   Temp 97.9  F (36.6  C) (Oral)   Ht 1.753 m (5' 9\")   Wt 79.8 kg (176 lb)   SpO2 98%   BMI 25.99 kg/m  "   EXAM:  GENERAL: healthy, alert and no distress  EYES: Eyes grossly normal to inspection, PERRL and conjunctivae and sclerae normal  HENT: ear canals and TM's normal, nose and mouth without ulcers or lesions  NECK: no adenopathy, no asymmetry, masses, or scars and thyroid normal to palpation  RESP: lungs clear to auscultation - no rales, rhonchi or wheezes  CV: regular rate and rhythm, normal S1 S2, no S3 or S4, no murmur, click or rub, no peripheral edema and peripheral pulses strong  ABDOMEN: soft, nontender, no hepatosplenomegaly, no masses and bowel sounds normal  MS: no gross musculoskeletal defects noted, no edema  SKIN: no suspicious lesions or rashes  NEURO: Normal strength and tone, mentation intact and speech normal  PSYCH: mentation appears normal, affect normal/bright    Diagnostic Test Results:  Labs reviewed in Epic    ASSESSMENT/PLAN:       ICD-10-CM    1. Encounter for routine adult medical exam with abnormal findings Z00.01    2. Encounter for screening for HIV Z11.4 **HIV Antigen Antibody Combo FUTURE 2mo   3. Screening for prostate cancer Z12.5 **Prostate spec antigen screen FUTURE 1yr   4. Special screening for malignant neoplasms, colon Z12.11 GASTROENTEROLOGY ADULT REF PROCEDURE ONLY [facility] [phone]   5. Coronary artery disease involving native coronary artery of native heart without angina pectoris I25.10    6. Hyperlipidemia LDL goal <70 E78.5    7. Screening for diabetes mellitus Z13.1 **A1C FUTURE 3mo   8. Overweight (BMI 25.0-29.9) E66.3    HX of CAD with recent NSTEMI in March with another stent placed to his LAD at that time. On brilinta and is over due for lipid follow-up. Pt not fasting today and already has future orders from cardiology so added to this so he could have them all drawn at one time prior to see his cardiologist for follow-up. He plans to schedule within the next couple of weeks.  Advised to get cardiology's opinion about time-frame to complete his  "screening colonoscopy as may need to wait until completion of anti-platelet therapy.  Had BMP in March that showed normal glucose, but will order a1c as reports hx of pre-diabetes in the past.  Encouraged ongoing healthy lifestyle choices and pt in agreement.     COUNSELING:  Reviewed preventive health counseling, as reflected in patient instructions       Regular exercise       Healthy diet/nutrition       HIV screeninx in teen years, 1x in adult years, and at intervals if high risk       Colon cancer screening       Prostate cancer screening    Estimated body mass index is 25.99 kg/m  as calculated from the following:    Height as of this encounter: 1.753 m (5' 9\").    Weight as of this encounter: 79.8 kg (176 lb).    Weight management plan: Discussed healthy diet and exercise guidelines     reports that he has never smoked. He has never used smokeless tobacco.      Counseling Resources:  ATP IV Guidelines  Pooled Cohorts Equation Calculator  FRAX Risk Assessment  ICSI Preventive Guidelines  Dietary Guidelines for Americans,   USDA's MyPlate  ASA Prophylaxis  Lung CA Screening    Dorys Vanessa PA-C  [facility]  "

## 2019-06-19 DIAGNOSIS — I25.10 CORONARY ARTERY DISEASE INVOLVING NATIVE CORONARY ARTERY OF NATIVE HEART WITHOUT ANGINA PECTORIS: ICD-10-CM

## 2019-06-19 DIAGNOSIS — I10 ESSENTIAL HYPERTENSION: ICD-10-CM

## 2019-06-19 RX ORDER — CARVEDILOL 3.12 MG/1
3.12 TABLET ORAL 2 TIMES DAILY WITH MEALS
Qty: 180 TABLET | Refills: 0 | Status: SHIPPED | OUTPATIENT
Start: 2019-06-19 | End: 2019-10-04

## 2019-07-17 NOTE — H&P
[facility]    History and Physical - Hospitalist Service       Date of Admission:  3/4/2019    Assessment & Plan   Vviiana Gilman is a 53 year old male with a PMH of premature CAD (NSTEMI 2011 s/p PCI w/ stenting to LAD and 1st obtuse marginal) admitted on 3/4/2019 with exertional chest pain and shortness of breath similar to his previous NSTEMI, found in ER work up to have a nonischemic ECG but elevated troponin of 0.154 consistent with NSTEMI.    1. NSTEMI: Exertional chest pain with radiation into left arm that worsens with walking and improves with rest since last evening. Previous history of NSTEMI in 2011, presenting symptoms are similar to previous NSTEMI.  Also has history of HTN and HLD; reports medical compliance and denies any tobacco use. He is currently comfortable at rest with no residual chest pain after receiving sublingual nitroglycerin x 3 and 324 mg aspirin. ECG demonstrates sinus bradycardia with rate of 57 and no ischemic findings; troponin elevated to 0.154; no CXR abnormality.  -Telemetry monitoring  -Trend troponins  -Prn O2, nitroglycerin, and morphine available  -Continue heparin gtt w/ pharmacy to dose  -Continue PTA lisinopril 5 mg daily, simvastatin 40 mg and daily aspirin 81 mg (received 324 mg ASA today)  -He was previously on metoprolol succinate 25 mg daily but this was discontinued due to fatigue per cardiology notes  -TTE  -NPO status for now in case he will have coronary angiogram done today, otherwise at midnight if procedure planned  -Cardiology consult    2. Hypertension: Urgently hypertensive on arrival, but BPs came down to 130s/80s after receiving sublingual nitroglycerin in the ER.  -Resume PTA lisinopril 5 mg daily  -Consider adding in low-dose beta blocker therapy like carvedilol 3.125 mg BID (HR has been in 50s-60s); will defer to cardiology given history of fatigue on metoprolol    3. Hyperlipidemia: Previous lipid panel 2/2017 showed T chol 212, HDL 43, LDL  "130, and TGs 196.  -Obtain FLP in AM  -Continue PTA atorvastatin 40 mg daily     Diet: NPO status if procedure today, otherwise NPO status at midnight if procedure planned for tomorrow  DVT Prophylaxis: On heparin gtt  Moore Catheter: not present  Code Status: FULL CODE    Disposition Plan   Expected discharge: 2 - 3 days, recommended to prior living arrangement once adequate pain management/ tolerating PO medications and stabilized and medically optimized from cardiology standpoint.  Entered: Vanessa Muñoz PA-C 03/04/2019, 2:38 PM     The patient's care was discussed with the Attending Physician, Dr. Johnson and Patient.    Vanessa Muñoz PA-C  [facility]    ______________________________________________________________________    Chief Complaint   Chest pain    History is obtained from the patient    History of Present Illness   Viviana Gilman is a 53 year old male with a PMH of premature CAD (NSTEMI 2011 s/p PCI w/ stenting to LAD and 1st obtuse marginal) admitted on 3/4/2019 with exertional chest pain and shortness of breath similar to his previous NSTEMI, found in ER work up to have a nonischemic ECG but elevated troponin of 0.154 consistent with NSTEMI.    Mr. Gilman reports that last evening he started having a \"poking\" sharp chest pain in the central anterior chest with radiation into his left arm associated with exertion and improved with rest.  His chest pain was associated with shortness of breath, particularly on exertion, and improved again with rest.  He denied diaphoresis, lightheadedness, nausea, or vomiting associated with his chest discomfort.  He reports that his chest pain is very similar to his previous NSTEMI in 2011 when he had to stents placed.  When his pain had not improved by the morning, he presented to the ER for further evaluation.  Between 2011 and his current ER visit, he has been following with cardiologist Dr. Fleming and has been quite stable since that time with intermittent " medical compliance over the years per cardiology notes.  He is a lifelong non-smoker.  He has a history of hypertension and hyperlipidemia and in the past year reports close to 100% compliance with his medications.  He is on aspirin and low-dose ACE inhibitor medical therapy along with a statin.  He used to be on metoprolol, but this was discontinued several years ago due to reported fatigue on that medication.  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  He denies any drug use or significant alcohol use.  He reports having a cold several weeks ago, but that has resolved.  He denies recent fever, chills, cough, wheezing, orthopnea, paroxysmal nocturnal dyspnea, lower extremity swelling, palpitations, abdominal pain, diarrhea, dysuria, or any other new complaints aside from the chest pain with shortness of breath.  He is comfortable at rest in the ER.     In the ER, urgently hypertensive with blood pressure 169/116, otherwise vital signs were stable.  His blood pressure transiently went down into the systolic 130s and diastolic 80s after receiving 3 sublingual nitroglycerin tablets and 325 mg aspirin, and his pain resolved after those interventions  EKG showed sinus bradycardia with heart rate in the upper 50s with no ischemic changes from previous EKGs.  Troponin was elevated at 0.154.  BMP and CBC were unremarkable.  Chest x-ray was negative for acute pathology.  The patient was started on a heparin drip and inpatient bed was requested for NSTEMI.    Review of Systems    The 10 point Review of Systems is negative other than noted in the HPI.    Past Medical History    I have reviewed this patient's medical history and updated it with pertinent information if needed.   Past Medical History:   Diagnosis Date     Coronary artery disease      History of blood transfusion 1987    ?pt thinks he may have had an Ulcer      History of coronary angiogram Sept 2011    stent LAD     Hypertension      Malaria 1978    [place]     Mixed hyperlipidemia     Hyperlipidemia     Old myocardial infarction     Myocardial Infarction       Past Surgical History   I have reviewed this patient's surgical history and updated it with pertinent information if needed.  Past Surgical History:   Procedure Laterality Date     PROCEDURE PLACEHOLDER ORTHO      L knee ACL repair       Social History   I have reviewed this patient's social history and updated it with pertinent information if needed.  Social History     Tobacco Use     Smoking status: Never Smoker     Smokeless tobacco: Never Used   Substance Use Topics     Alcohol use: Yes     Alcohol/week: 0.0 oz     Comment: Occaisionally     Drug use: No       Family History   I have reviewed this patient's family history and updated it with pertinent information if needed.   Family History   Problem Relation Age of Onset     Diabetes Mother         type 2     Hypertension Mother      Hyperlipidemia Mother      Peptic Ulcer Disease Father         When first moved to the [facility]     Coronary Artery Disease No family hx of      Cerebrovascular Disease No family hx of      Colon Cancer No family hx of      Prostate Cancer No family hx of      Thyroid Disease No family hx of      Genetic Disorder No family hx of        Prior to Admission Medications   Prior to Admission Medications   Prescriptions Last Dose Informant Patient Reported? Taking?   aspirin 81 MG EC tablet 3/3/2019 at Unknown time  Yes Yes   Sig: Take 81 mg by mouth every evening   atorvastatin (LIPITOR) 40 MG tablet 3/3/2019 at pm  No Yes   Sig: Take 1 tablet (40 mg) by mouth daily   lisinopril (PRINIVIL/ZESTRIL) 10 MG tablet 3/3/2019 at pm  No Yes   Sig: Take 1 tablet (10 mg) by mouth daily      Facility-Administered Medications: None     Allergies   No Known Allergies    Physical Exam   Vital Signs: Temp: 97.8  F  (36.6  C) Temp src: Temporal BP: 130/86 Pulse: 63 Heart Rate: 63 Resp: 12 SpO2: 98 % O2 Device: None (Room air)    Weight: 176 lbs 0 oz    GENERAL:  Comfortable.  PSYCH: pleasant, oriented, No acute distress.  HEENT:  Atraumatic, normocephalic. PERRLA. Normal conjunctiva, normal hearing, and oropharynx is normal.  NECK:  Supple, no neck vein distention, adenopathy or bruits, normal thyroid.  HEART:  Normal S1, S2 with no murmur, no pericardial rub, gallops or S3 or S4.  LUNGS:  Clear to auscultation, normal respiratory effort. No wheezing, rales or ronchi.  GI:  Soft, no hepatosplenomegaly, normal bowel sounds. Non-tender, non distended.   EXTREMITIES:  No pedal edema, +2 pulses bilateral and equal.  SKIN:  Dry to touch, No rash, wound or ulcerations.  NEUROLOGIC:  CN 2-12 intact, BL 5/5 symmetric upper and lower extremity strength, sensation is intact with no focal deficits.     Data   Data reviewed today: I reviewed all medications, new labs and imaging results over the last 24 hours. I personally reviewed:    Results for orders placed or performed during the hospital encounter of 03/04/19   XR Chest 2 Views    Narrative    CHEST TWO VIEWS March 4, 2019 1:14 PM     HISTORY: Chest pain.    COMPARISON: Chest x-ray 9/28/2011.      Impression    IMPRESSION: PA and lateral views of the chest. Lungs are clear. Heart  is normal in size. No effusions are evident. No pneumothorax.    LAYTON MCCLOUD MD   CBC with platelets differential   Result Value Ref Range    WBC 6.4 4.0 - 11.0 10e9/L    RBC Count 5.68 4.4 - 5.9 10e12/L    Hemoglobin 15.2 13.3 - 17.7 g/dL    Hematocrit 47.9 40.0 - 53.0 %    MCV 84 78 - 100 fl    MCH 26.8 26.5 - 33.0 pg    MCHC 31.7 31.5 - 36.5 g/dL    RDW 14.2 10.0 - 15.0 %    Platelet Count 164 150 - 450 10e9/L    Diff Method Automated Method     % Neutrophils 51.3 %    % Lymphocytes 38.6 %    % Monocytes 6.6 %    % Eosinophils 2.4 %    % Basophils 0.9 %    % Immature Granulocytes 0.2 %    Nucleated RBCs  0 0 /100    Absolute Neutrophil 3.3 1.6 - 8.3 10e9/L    Absolute Lymphocytes 2.5 0.8 - 5.3 10e9/L    Absolute Monocytes 0.4 0.0 - 1.3 10e9/L    Absolute Eosinophils 0.2 0.0 - 0.7 10e9/L    Absolute Basophils 0.1 0.0 - 0.2 10e9/L    Abs Immature Granulocytes 0.0 0 - 0.4 10e9/L    Absolute Nucleated RBC 0.0    Basic metabolic panel   Result Value Ref Range    Sodium 138 133 - 144 mmol/L    Potassium 3.8 3.4 - 5.3 mmol/L    Chloride 104 94 - 109 mmol/L    Carbon Dioxide 28 20 - 32 mmol/L    Anion Gap 6 3 - 14 mmol/L    Glucose 91 70 - 99 mg/dL    Urea Nitrogen 16 7 - 30 mg/dL    Creatinine 0.94 0.66 - 1.25 mg/dL    GFR Estimate >90 >60 mL/min/[1.73_m2]    GFR Estimate If Black >90 >60 mL/min/[1.73_m2]    Calcium 9.4 8.5 - 10.1 mg/dL   Troponin I   Result Value Ref Range    Troponin I ES 0.154 (HH) 0.000 - 0.045 ug/L   EKG 12 lead   Result Value Ref Range    Interpretation ECG Click View Image link to view waveform and result    Troponin POCT   Result Value Ref Range    Troponin I 0.10 (H) 0.00 - 0.08 ug/L        Island Pedicle Flap Text: The defect edges were debeveled with a #15 scalpel blade.  Given the location of the defect, shape of the defect and the proximity to free margins an island pedicle advancement flap was deemed most appropriate.  Using a sterile surgical marker, an appropriate advancement flap was drawn incorporating the defect, outlining the appropriate donor tissue and placing the expected incisions within the relaxed skin tension lines where possible.    The area thus outlined was incised deep to adipose tissue with a #15 scalpel blade.  The skin margins were undermined to an appropriate distance in all directions around the primary defect and laterally outward around the island pedicle utilizing iris scissors.  There was minimal undermining beneath the pedicle flap.

## 2019-10-01 DIAGNOSIS — I10 ESSENTIAL HYPERTENSION: ICD-10-CM

## 2019-10-01 DIAGNOSIS — I25.10 CORONARY ARTERY DISEASE INVOLVING NATIVE CORONARY ARTERY OF NATIVE HEART WITHOUT ANGINA PECTORIS: ICD-10-CM

## 2019-10-01 RX ORDER — ATORVASTATIN CALCIUM 80 MG/1
80 TABLET, FILM COATED ORAL DAILY
Qty: 90 TABLET | Refills: 1 | Status: SHIPPED | OUTPATIENT
Start: 2019-10-01 | End: 2019-10-04

## 2019-10-01 RX ORDER — LISINOPRIL 10 MG/1
10 TABLET ORAL DAILY
Qty: 90 TABLET | Refills: 1 | Status: SHIPPED | OUTPATIENT
Start: 2019-10-01 | End: 2019-10-04

## 2019-10-01 RX ORDER — ASPIRIN 81 MG/1
81 TABLET ORAL DAILY
Qty: 90 TABLET | Refills: 1 | Status: SHIPPED | OUTPATIENT
Start: 2019-10-01 | End: 2020-04-10

## 2019-10-04 ENCOUNTER — OFFICE VISIT (OUTPATIENT)
Dept: CARDIOLOGY | Facility: CLINIC | Age: 54
End: 2019-10-04
Payer: COMMERCIAL

## 2019-10-04 VITALS
BODY MASS INDEX: 24.66 KG/M2 | HEIGHT: 69 IN | HEART RATE: 48 BPM | WEIGHT: 166.5 LBS | DIASTOLIC BLOOD PRESSURE: 80 MMHG | SYSTOLIC BLOOD PRESSURE: 122 MMHG

## 2019-10-04 DIAGNOSIS — E78.5 HYPERLIPIDEMIA LDL GOAL <70: ICD-10-CM

## 2019-10-04 DIAGNOSIS — I25.10 CORONARY ARTERY DISEASE INVOLVING NATIVE CORONARY ARTERY OF NATIVE HEART WITHOUT ANGINA PECTORIS: Primary | ICD-10-CM

## 2019-10-04 DIAGNOSIS — I10 ESSENTIAL HYPERTENSION: ICD-10-CM

## 2019-10-04 LAB
CHOLEST SERPL-MCNC: 119 MG/DL
HDLC SERPL-MCNC: 47 MG/DL
LDLC SERPL CALC-MCNC: 54 MG/DL
NONHDLC SERPL-MCNC: 72 MG/DL
TRIGL SERPL-MCNC: 91 MG/DL

## 2019-10-04 PROCEDURE — 99213 OFFICE O/P EST LOW 20 MIN: CPT | Performed by: INTERNAL MEDICINE

## 2019-10-04 PROCEDURE — 80061 LIPID PANEL: CPT | Performed by: NURSE PRACTITIONER

## 2019-10-04 PROCEDURE — 36415 COLL VENOUS BLD VENIPUNCTURE: CPT | Performed by: NURSE PRACTITIONER

## 2019-10-04 RX ORDER — ATORVASTATIN CALCIUM 80 MG/1
80 TABLET, FILM COATED ORAL DAILY
Qty: 90 TABLET | Refills: 3 | Status: SHIPPED | OUTPATIENT
Start: 2019-10-04 | End: 2020-10-29

## 2019-10-04 RX ORDER — LISINOPRIL 10 MG/1
10 TABLET ORAL DAILY
Qty: 90 TABLET | Refills: 3 | Status: SHIPPED | OUTPATIENT
Start: 2019-10-04 | End: 2020-10-29

## 2019-10-04 RX ORDER — CARVEDILOL 3.12 MG/1
3.12 TABLET ORAL 2 TIMES DAILY WITH MEALS
Qty: 180 TABLET | Refills: 3 | Status: SHIPPED | OUTPATIENT
Start: 2019-10-04 | End: 2020-10-29

## 2019-10-04 ASSESSMENT — MIFFLIN-ST. JEOR: SCORE: 1590.62

## 2019-10-04 NOTE — LETTER
10/4/2019    Dorys Vanessa PA-C  [address] [facility] [address]    RE: Viviana Gilman       Dear Colleague,    I had the pleasure of seeing Viviana Gilman in the [facility].    Cardiology Progress Note          Assessment and Plan:     1. Coronary artery disease, premature.  Status post PCI of the LAD and OM1 2011 and PCI of the LAD 2019.    Discussed how extremely important it is for him to be compliant on his medications.  He is tolerating his Brilinta well and had a largely thrombotic infarct recently.  For this reason we will keep him on Brilinta for a year and a half total.  He should continue on this until I see him back in 1 year.    Continue statin at maximum strength.      This note was transcribed using electronic voice recognition software and there may be typographical errors present.                Interval History:   The patient is a very pleasant 54 year old whom I have been following for coronary artery disease status post PCI 2011 and again in March 2019 when he was noncompliant on his medications.  He has been variably compliant in the past.  His LDL was back at his baseline 117 which again reflects his lack of compliance.  He does admit to being off his medications for a few weeks prior to his infarction.    At this point, he has been tolerating medications well without any fatigue or dyspnea.  He feels pretty well.  He is compliant on his medications.  LDL of 54 reflects this.                     Review of Systems:   Review of Systems:  Skin:  Negative     Eyes:  Positive for glasses  ENT:  Negative    Respiratory:  Negative    Cardiovascular:  Negative    Gastroenterology: Negative    Genitourinary:  Negative    Musculoskeletal:  Positive for joint pain  Neurologic:  Negative    Psychiatric:  Negative    Heme/Lymph/Imm:  Negative    Endocrine:  Negative                Physical Exam:     Vitals: /80 (BP Location: Right arm, Patient Position: Sitting, Cuff Size:  "Adult Regular)   Pulse (!) 48   Ht 1.753 m (5' 9\")   Wt 75.5 kg (166 lb 8 oz)   BMI 24.59 kg/m     Constitutional:  cooperative, alert and oriented, well developed, well nourished, in no acute distress        Skin:  warm and dry to the touch, no apparent skin lesions or masses noted        Head:  normocephalic, no masses or lesions        Eyes:  pupils equal and round, conjunctivae and lids unremarkable, sclera white, no xanthalasma, EOMS intact, no nystagmus        ENT:  no pallor or cyanosis, dentition good        Neck:  JVP normal        Chest:  normal breath sounds, clear to auscultation, normal A-P diameter, normal symmetry, normal respiratory excursion, no use of accessory muscles        Cardiac: regular rhythm;no murmurs, gallops or rubs detected                  Abdomen:      benign    Extremities and Back:  no deformities, clubbing, cyanosis, erythema observed;no edema        Neurological:  no gross motor deficits;affect appropriate                 Medications:     Current Outpatient Medications   Medication Sig Dispense Refill     aspirin 81 MG EC tablet Take 1 tablet (81 mg) by mouth daily 90 tablet 1     atorvastatin (LIPITOR) 80 MG tablet Take 1 tablet (80 mg) by mouth daily 90 tablet 3     carvedilol (COREG) 3.125 MG tablet Take 1 tablet (3.125 mg) by mouth 2 times daily (with meals) 180 tablet 3     lisinopril (PRINIVIL/ZESTRIL) 10 MG tablet Take 1 tablet (10 mg) by mouth daily 90 tablet 3     nitroGLYcerin (NITROSTAT) 0.4 MG sublingual tablet For chest pain place 1 tablet under the tongue repeat every 5 minutes for 3 doses if needed. 25 tablet 1     ticagrelor (BRILINTA) 90 MG tablet Take 1 tablet (90 mg) by mouth every 12 hours 180 tablet 3                Data:   All laboratory data reviewed  Results for orders placed or performed in visit on 10/04/19 (from the past 24 hour(s))   Lipid Profile   Result Value Ref Range    Cholesterol 119 <200 mg/dL    Triglycerides 91 <150 mg/dL    HDL " Cholesterol 47 >39 mg/dL    LDL Cholesterol Calculated 54 <100 mg/dL    Non HDL Cholesterol 72 <130 mg/dL       All laboratory data reviewed  Lab Results   Component Value Date    CHOL 119 10/04/2019     Lab Results   Component Value Date    HDL 47 10/04/2019     Lab Results   Component Value Date    LDL 54 10/04/2019     Lab Results   Component Value Date    TRIG 91 10/04/2019     Lab Results   Component Value Date    CHOLHDLRATIO 3.4 11/20/2012     No results found for: TSH  Last Basic Metabolic Panel:  Lab Results   Component Value Date     03/06/2019      Lab Results   Component Value Date    POTASSIUM 3.6 03/06/2019     Lab Results   Component Value Date    CHLORIDE 108 03/06/2019     Lab Results   Component Value Date    JAY 8.4 03/06/2019     Lab Results   Component Value Date    CO2 22 03/06/2019     Lab Results   Component Value Date    BUN 12 03/06/2019     Lab Results   Component Value Date    CR 0.76 03/06/2019     Lab Results   Component Value Date    GLC 92 03/06/2019     Lab Results   Component Value Date    WBC 8.6 03/06/2019     Lab Results   Component Value Date    RBC 5.17 03/06/2019     Lab Results   Component Value Date    HGB 14.2 03/06/2019     Lab Results   Component Value Date    HCT 43.3 03/06/2019     Lab Results   Component Value Date    MCV 84 03/06/2019     Lab Results   Component Value Date    MCH 26.7 03/06/2019     Lab Results   Component Value Date    MCHC 31.9 03/06/2019     Lab Results   Component Value Date    RDW 14.3 03/06/2019     Lab Results   Component Value Date     03/06/2019                 Thank you for allowing me to participate in the care of your patient.      Sincerely,     Amauri Fleming MD     [facility]

## 2019-10-04 NOTE — PROGRESS NOTES
"Cardiology Progress Note          Assessment and Plan:     1. Coronary artery disease, premature.  Status post PCI of the LAD and OM1 2011 and PCI of the LAD 2019.    Discussed how extremely important it is for him to be compliant on his medications.  He is tolerating his Brilinta well and had a largely thrombotic infarct recently.  For this reason we will keep him on Brilinta for a year and a half total.  He should continue on this until I see him back in 1 year.    Continue statin at maximum strength.      This note was transcribed using electronic voice recognition software and there may be typographical errors present.                Interval History:   The patient is a very pleasant 54 year old whom I have been following for coronary artery disease status post PCI 2011 and again in March 2019 when he was noncompliant on his medications.  He has been variably compliant in the past.  His LDL was back at his baseline 117 which again reflects his lack of compliance.  He does admit to being off his medications for a few weeks prior to his infarction.    At this point, he has been tolerating medications well without any fatigue or dyspnea.  He feels pretty well.  He is compliant on his medications.  LDL of 54 reflects this.                     Review of Systems:   Review of Systems:  Skin:  Negative     Eyes:  Positive for glasses  ENT:  Negative    Respiratory:  Negative    Cardiovascular:  Negative    Gastroenterology: Negative    Genitourinary:  Negative    Musculoskeletal:  Positive for joint pain  Neurologic:  Negative    Psychiatric:  Negative    Heme/Lymph/Imm:  Negative    Endocrine:  Negative                Physical Exam:     Vitals: /80 (BP Location: Right arm, Patient Position: Sitting, Cuff Size: Adult Regular)   Pulse (!) 48   Ht 1.753 m (5' 9\")   Wt 75.5 kg (166 lb 8 oz)   BMI 24.59 kg/m    Constitutional:  cooperative, alert and oriented, well developed, well nourished, in no acute distress   "      Skin:  warm and dry to the touch, no apparent skin lesions or masses noted        Head:  normocephalic, no masses or lesions        Eyes:  pupils equal and round, conjunctivae and lids unremarkable, sclera white, no xanthalasma, EOMS intact, no nystagmus        ENT:  no pallor or cyanosis, dentition good        Neck:  JVP normal        Chest:  normal breath sounds, clear to auscultation, normal A-P diameter, normal symmetry, normal respiratory excursion, no use of accessory muscles        Cardiac: regular rhythm;no murmurs, gallops or rubs detected                  Abdomen:      benign    Extremities and Back:  no deformities, clubbing, cyanosis, erythema observed;no edema        Neurological:  no gross motor deficits;affect appropriate                 Medications:     Current Outpatient Medications   Medication Sig Dispense Refill     aspirin 81 MG EC tablet Take 1 tablet (81 mg) by mouth daily 90 tablet 1     atorvastatin (LIPITOR) 80 MG tablet Take 1 tablet (80 mg) by mouth daily 90 tablet 3     carvedilol (COREG) 3.125 MG tablet Take 1 tablet (3.125 mg) by mouth 2 times daily (with meals) 180 tablet 3     lisinopril (PRINIVIL/ZESTRIL) 10 MG tablet Take 1 tablet (10 mg) by mouth daily 90 tablet 3     nitroGLYcerin (NITROSTAT) 0.4 MG sublingual tablet For chest pain place 1 tablet under the tongue repeat every 5 minutes for 3 doses if needed. 25 tablet 1     ticagrelor (BRILINTA) 90 MG tablet Take 1 tablet (90 mg) by mouth every 12 hours 180 tablet 3                Data:   All laboratory data reviewed  Results for orders placed or performed in visit on 10/04/19 (from the past 24 hour(s))   Lipid Profile   Result Value Ref Range    Cholesterol 119 <200 mg/dL    Triglycerides 91 <150 mg/dL    HDL Cholesterol 47 >39 mg/dL    LDL Cholesterol Calculated 54 <100 mg/dL    Non HDL Cholesterol 72 <130 mg/dL       All laboratory data reviewed  Lab Results   Component Value Date    CHOL 119 10/04/2019     Lab Results    Component Value Date    HDL 47 10/04/2019     Lab Results   Component Value Date    LDL 54 10/04/2019     Lab Results   Component Value Date    TRIG 91 10/04/2019     Lab Results   Component Value Date    CHOLHDLRATIO 3.4 11/20/2012     No results found for: TSH  Last Basic Metabolic Panel:  Lab Results   Component Value Date     03/06/2019      Lab Results   Component Value Date    POTASSIUM 3.6 03/06/2019     Lab Results   Component Value Date    CHLORIDE 108 03/06/2019     Lab Results   Component Value Date    JAY 8.4 03/06/2019     Lab Results   Component Value Date    CO2 22 03/06/2019     Lab Results   Component Value Date    BUN 12 03/06/2019     Lab Results   Component Value Date    CR 0.76 03/06/2019     Lab Results   Component Value Date    GLC 92 03/06/2019     Lab Results   Component Value Date    WBC 8.6 03/06/2019     Lab Results   Component Value Date    RBC 5.17 03/06/2019     Lab Results   Component Value Date    HGB 14.2 03/06/2019     Lab Results   Component Value Date    HCT 43.3 03/06/2019     Lab Results   Component Value Date    MCV 84 03/06/2019     Lab Results   Component Value Date    MCH 26.7 03/06/2019     Lab Results   Component Value Date    MCHC 31.9 03/06/2019     Lab Results   Component Value Date    RDW 14.3 03/06/2019     Lab Results   Component Value Date     03/06/2019

## 2019-10-07 ENCOUNTER — TELEPHONE (OUTPATIENT)
Dept: CARDIOLOGY | Facility: CLINIC | Age: 54
End: 2019-10-07

## 2020-04-10 DIAGNOSIS — I25.10 CORONARY ARTERY DISEASE INVOLVING NATIVE CORONARY ARTERY OF NATIVE HEART WITHOUT ANGINA PECTORIS: ICD-10-CM

## 2020-04-10 RX ORDER — ASPIRIN 81 MG/1
81 TABLET ORAL DAILY
Qty: 90 TABLET | Refills: 1 | Status: SHIPPED | OUTPATIENT
Start: 2020-04-10 | End: 2020-10-29

## 2020-08-28 ENCOUNTER — TRANSFERRED RECORDS (OUTPATIENT)
Dept: HEALTH INFORMATION MANAGEMENT | Facility: CLINIC | Age: 55
End: 2020-08-28

## 2020-08-28 LAB — RETINOPATHY: NEGATIVE

## 2020-10-29 DIAGNOSIS — I10 ESSENTIAL HYPERTENSION: ICD-10-CM

## 2020-10-29 DIAGNOSIS — I25.10 CORONARY ARTERY DISEASE INVOLVING NATIVE CORONARY ARTERY OF NATIVE HEART WITHOUT ANGINA PECTORIS: ICD-10-CM

## 2020-10-29 RX ORDER — ATORVASTATIN CALCIUM 80 MG/1
80 TABLET, FILM COATED ORAL DAILY
Qty: 90 TABLET | Refills: 0 | Status: SHIPPED | OUTPATIENT
Start: 2020-10-29 | End: 2021-03-23

## 2020-10-29 RX ORDER — CARVEDILOL 3.12 MG/1
3.12 TABLET ORAL 2 TIMES DAILY WITH MEALS
Qty: 180 TABLET | Refills: 0 | Status: SHIPPED | OUTPATIENT
Start: 2020-10-29 | End: 2021-03-23

## 2020-10-29 RX ORDER — LISINOPRIL 10 MG/1
10 TABLET ORAL DAILY
Qty: 90 TABLET | Refills: 0 | Status: SHIPPED | OUTPATIENT
Start: 2020-10-29 | End: 2021-03-23

## 2020-10-29 RX ORDER — ASPIRIN 81 MG/1
81 TABLET ORAL DAILY
Qty: 90 TABLET | Refills: 0 | Status: SHIPPED | OUTPATIENT
Start: 2020-10-29 | End: 2021-03-23

## 2021-03-23 DIAGNOSIS — I10 ESSENTIAL HYPERTENSION: ICD-10-CM

## 2021-03-23 DIAGNOSIS — I25.10 CORONARY ARTERY DISEASE INVOLVING NATIVE CORONARY ARTERY OF NATIVE HEART WITHOUT ANGINA PECTORIS: ICD-10-CM

## 2021-03-23 RX ORDER — ASPIRIN 81 MG/1
81 TABLET ORAL DAILY
Qty: 90 TABLET | Refills: 0 | Status: SHIPPED | OUTPATIENT
Start: 2021-03-23 | End: 2021-06-23

## 2021-03-23 RX ORDER — CARVEDILOL 3.12 MG/1
3.12 TABLET ORAL 2 TIMES DAILY WITH MEALS
Qty: 180 TABLET | Refills: 0 | Status: SHIPPED | OUTPATIENT
Start: 2021-03-23 | End: 2021-06-23

## 2021-03-23 RX ORDER — ATORVASTATIN CALCIUM 80 MG/1
80 TABLET, FILM COATED ORAL DAILY
Qty: 90 TABLET | Refills: 0 | Status: SHIPPED | OUTPATIENT
Start: 2021-03-23 | End: 2021-06-23

## 2021-03-23 RX ORDER — LISINOPRIL 10 MG/1
10 TABLET ORAL DAILY
Qty: 90 TABLET | Refills: 0 | Status: SHIPPED | OUTPATIENT
Start: 2021-03-23 | End: 2021-06-23

## 2021-03-23 NOTE — TELEPHONE ENCOUNTER
Rx refill sent in per protocol and Dr. Fleming's last note:    1. Coronary artery disease, premature.  Status post PCI of the LAD and OM1 2011 and PCI of the LAD 2019.    Discussed how extremely important it is for him to be compliant on his medications.  He is tolerating his Brilinta well and had a largely thrombotic infarct recently.  For this reason we will keep him on Brilinta for a year and a half total.  He should continue on this until I see him back in 1 year.    Continue statin at maximum strength.

## 2021-05-28 ENCOUNTER — OFFICE VISIT (OUTPATIENT)
Dept: CARDIOLOGY | Facility: CLINIC | Age: 56
End: 2021-05-28
Payer: COMMERCIAL

## 2021-05-28 VITALS
DIASTOLIC BLOOD PRESSURE: 87 MMHG | SYSTOLIC BLOOD PRESSURE: 149 MMHG | WEIGHT: 165.9 LBS | OXYGEN SATURATION: 100 % | BODY MASS INDEX: 24.57 KG/M2 | HEIGHT: 69 IN | HEART RATE: 49 BPM

## 2021-05-28 DIAGNOSIS — I25.10 CORONARY ARTERY DISEASE INVOLVING NATIVE CORONARY ARTERY OF NATIVE HEART WITHOUT ANGINA PECTORIS: ICD-10-CM

## 2021-05-28 DIAGNOSIS — I10 ESSENTIAL HYPERTENSION: Primary | ICD-10-CM

## 2021-05-28 PROCEDURE — 99214 OFFICE O/P EST MOD 30 MIN: CPT | Performed by: INTERNAL MEDICINE

## 2021-05-28 ASSESSMENT — MIFFLIN-ST. JEOR: SCORE: 1582.9

## 2021-05-28 NOTE — LETTER
5/28/2021    Dorys Vanessa PA-C  [address] [facility] [address]    RE: Viviana Karen Gilman       Dear Colleague,    I had the pleasure of seeing Viviana Karen Gilman in the [facility].    Cardiology Progress Note          Assessment and Plan:       1. Coronary artery disease with PCI of the LAD 2019, previous history of LAD and circumflex PCI    Continue maximal medical management.  Continue maximum statin.    Patient has some resting bradycardia but asymptomatic.      2. Hypertension, suboptimal control    Patient did not take his lisinopril this morning.  We will have him take his medications and see us back for nurse blood pressure check in a week or 2.  We might increase his lisinopril if needed.    Routine follow-up with me in 1 year.    30 minutes was spent with the patient, precharting and reviewing tests as well as post visit charting all done today..    This note was transcribed using electronic voice recognition software and there may be typographical errors present.                    Interval History:     The patient is a very pleasant 55 year old whom I have been following for premature coronary artery disease.  He states he is taking his medications except he did not take his antihypertensives today.  He feels well without any shortness of breath or presyncope.    He is still on the Brilinta.    He has no specific cardiovascular complaints at this time.                     Review of Systems:     Review of Systems:  Skin:  Negative bruising   Eyes:  Positive for glasses  ENT:  Negative    Respiratory:  Negative dyspnea on exertion  Cardiovascular:  Negative fatigue;Positive for  Gastroenterology: Negative heartburn  Genitourinary:  Negative    Musculoskeletal:  Positive for joint pain  Neurologic:  Negative headaches  Psychiatric:  Negative    Heme/Lymph/Imm:  Negative easy bruising  Endocrine:  Negative                Physical Exam:  "    Vitals: BP (!) 149/87 (BP Location: Right arm, Patient Position: Sitting, Cuff Size: Adult Regular)   Pulse (!) 49   Ht 1.753 m (5' 9\")   Wt 75.3 kg (165 lb 14.4 oz)   SpO2 100%   BMI 24.50 kg/m    Constitutional:  cooperative, alert and oriented, well developed, well nourished, in no acute distress        Skin:  warm and dry to the touch, no apparent skin lesions or masses noted        Head:  normocephalic, no masses or lesions        Eyes:  pupils equal and round, conjunctivae and lids unremarkable, sclera white, no xanthalasma, EOMS intact, no nystagmus        Chest:  normal breath sounds, clear to auscultation, normal A-P diameter, normal symmetry, normal respiratory excursion, no use of accessory muscles        Cardiac: regular rhythm;no murmurs, gallops or rubs detected                  Extremities and Back:  no deformities, clubbing, cyanosis, erythema observed;no edema        Neurological:  no gross motor deficits;affect appropriate                 Medications:     Current Outpatient Medications   Medication Sig Dispense Refill     aspirin 81 MG EC tablet Take 1 tablet (81 mg) by mouth daily 90 tablet 0     atorvastatin (LIPITOR) 80 MG tablet Take 1 tablet (80 mg) by mouth daily 90 tablet 0     carvedilol (COREG) 3.125 MG tablet Take 1 tablet (3.125 mg) by mouth 2 times daily (with meals) 180 tablet 0     lisinopril (ZESTRIL) 10 MG tablet Take 1 tablet (10 mg) by mouth daily 90 tablet 0     nitroGLYcerin (NITROSTAT) 0.4 MG sublingual tablet For chest pain place 1 tablet under the tongue repeat every 5 minutes for 3 doses if needed. (Patient not taking: Reported on 5/28/2021) 25 tablet 1                Data:   All laboratory data reviewed  No results found for this or any previous visit (from the past 24 hour(s)).    All laboratory data reviewed  Lab Results   Component Value Date    CHOL 119 10/04/2019     Lab Results   Component Value Date    HDL 47 10/04/2019     Lab Results   Component Value Date "    LDL 54 10/04/2019     Lab Results   Component Value Date    TRIG 91 10/04/2019     Lab Results   Component Value Date    CHOLHDLRATIO 3.4 11/20/2012     No results found for: TSH  Last Basic Metabolic Panel:  Lab Results   Component Value Date     03/06/2019      Lab Results   Component Value Date    POTASSIUM 3.6 03/06/2019     Lab Results   Component Value Date    CHLORIDE 108 03/06/2019     Lab Results   Component Value Date    JAY 8.4 03/06/2019     Lab Results   Component Value Date    CO2 22 03/06/2019     Lab Results   Component Value Date    BUN 12 03/06/2019     Lab Results   Component Value Date    CR 0.76 03/06/2019     Lab Results   Component Value Date    GLC 92 03/06/2019     Lab Results   Component Value Date    WBC 8.6 03/06/2019     Lab Results   Component Value Date    RBC 5.17 03/06/2019     Lab Results   Component Value Date    HGB 14.2 03/06/2019     Lab Results   Component Value Date    HCT 43.3 03/06/2019     Lab Results   Component Value Date    MCV 84 03/06/2019     Lab Results   Component Value Date    MCH 26.7 03/06/2019     Lab Results   Component Value Date    MCHC 31.9 03/06/2019     Lab Results   Component Value Date    RDW 14.3 03/06/2019     Lab Results   Component Value Date     03/06/2019     Thank you for allowing me to participate in the care of your patient.      Sincerely,     Amauri Fleming MD     [facility]    cc:   No referring provider defined for this encounter.

## 2021-05-28 NOTE — PROGRESS NOTES
"Cardiology Progress Note          Assessment and Plan:       1. Coronary artery disease with PCI of the LAD 2019, previous history of LAD and circumflex PCI    Continue maximal medical management.  Continue maximum statin.    Patient has some resting bradycardia but asymptomatic.      2. Hypertension, suboptimal control    Patient did not take his lisinopril this morning.  We will have him take his medications and see us back for nurse blood pressure check in a week or 2.  We might increase his lisinopril if needed.    Routine follow-up with me in 1 year.    30 minutes was spent with the patient, precharting and reviewing tests as well as post visit charting all done today..    This note was transcribed using electronic voice recognition software and there may be typographical errors present.                    Interval History:     The patient is a very pleasant 55 year old whom I have been following for premature coronary artery disease.  He states he is taking his medications except he did not take his antihypertensives today.  He feels well without any shortness of breath or presyncope.    He is still on the Brilinta.    He has no specific cardiovascular complaints at this time.                     Review of Systems:     Review of Systems:  Skin:  Negative bruising   Eyes:  Positive for glasses  ENT:  Negative    Respiratory:  Negative dyspnea on exertion  Cardiovascular:  Negative fatigue;Positive for  Gastroenterology: Negative heartburn  Genitourinary:  Negative    Musculoskeletal:  Positive for joint pain  Neurologic:  Negative headaches  Psychiatric:  Negative    Heme/Lymph/Imm:  Negative easy bruising  Endocrine:  Negative                Physical Exam:     Vitals: BP (!) 149/87 (BP Location: Right arm, Patient Position: Sitting, Cuff Size: Adult Regular)   Pulse (!) 49   Ht 1.753 m (5' 9\")   Wt 75.3 kg (165 lb 14.4 oz)   SpO2 100%   BMI 24.50 kg/m    Constitutional:  cooperative, alert and oriented, " well developed, well nourished, in no acute distress        Skin:  warm and dry to the touch, no apparent skin lesions or masses noted        Head:  normocephalic, no masses or lesions        Eyes:  pupils equal and round, conjunctivae and lids unremarkable, sclera white, no xanthalasma, EOMS intact, no nystagmus        Chest:  normal breath sounds, clear to auscultation, normal A-P diameter, normal symmetry, normal respiratory excursion, no use of accessory muscles        Cardiac: regular rhythm;no murmurs, gallops or rubs detected                  Extremities and Back:  no deformities, clubbing, cyanosis, erythema observed;no edema        Neurological:  no gross motor deficits;affect appropriate                 Medications:     Current Outpatient Medications   Medication Sig Dispense Refill     aspirin 81 MG EC tablet Take 1 tablet (81 mg) by mouth daily 90 tablet 0     atorvastatin (LIPITOR) 80 MG tablet Take 1 tablet (80 mg) by mouth daily 90 tablet 0     carvedilol (COREG) 3.125 MG tablet Take 1 tablet (3.125 mg) by mouth 2 times daily (with meals) 180 tablet 0     lisinopril (ZESTRIL) 10 MG tablet Take 1 tablet (10 mg) by mouth daily 90 tablet 0     nitroGLYcerin (NITROSTAT) 0.4 MG sublingual tablet For chest pain place 1 tablet under the tongue repeat every 5 minutes for 3 doses if needed. (Patient not taking: Reported on 5/28/2021) 25 tablet 1                Data:   All laboratory data reviewed  No results found for this or any previous visit (from the past 24 hour(s)).    All laboratory data reviewed  Lab Results   Component Value Date    CHOL 119 10/04/2019     Lab Results   Component Value Date    HDL 47 10/04/2019     Lab Results   Component Value Date    LDL 54 10/04/2019     Lab Results   Component Value Date    TRIG 91 10/04/2019     Lab Results   Component Value Date    CHOLHDLRATIO 3.4 11/20/2012     No results found for: TSH  Last Basic Metabolic Panel:  Lab Results   Component Value Date      03/06/2019      Lab Results   Component Value Date    POTASSIUM 3.6 03/06/2019     Lab Results   Component Value Date    CHLORIDE 108 03/06/2019     Lab Results   Component Value Date    JAY 8.4 03/06/2019     Lab Results   Component Value Date    CO2 22 03/06/2019     Lab Results   Component Value Date    BUN 12 03/06/2019     Lab Results   Component Value Date    CR 0.76 03/06/2019     Lab Results   Component Value Date    GLC 92 03/06/2019     Lab Results   Component Value Date    WBC 8.6 03/06/2019     Lab Results   Component Value Date    RBC 5.17 03/06/2019     Lab Results   Component Value Date    HGB 14.2 03/06/2019     Lab Results   Component Value Date    HCT 43.3 03/06/2019     Lab Results   Component Value Date    MCV 84 03/06/2019     Lab Results   Component Value Date    MCH 26.7 03/06/2019     Lab Results   Component Value Date    MCHC 31.9 03/06/2019     Lab Results   Component Value Date    RDW 14.3 03/06/2019     Lab Results   Component Value Date     03/06/2019

## 2021-06-11 ENCOUNTER — ALLIED HEALTH/NURSE VISIT (OUTPATIENT)
Dept: CARDIOLOGY | Facility: CLINIC | Age: 56
End: 2021-06-11
Attending: INTERNAL MEDICINE
Payer: COMMERCIAL

## 2021-06-11 ENCOUNTER — DOCUMENTATION ONLY (OUTPATIENT)
Dept: CARDIOLOGY | Facility: CLINIC | Age: 56
End: 2021-06-11

## 2021-06-11 VITALS — HEART RATE: 60 BPM | SYSTOLIC BLOOD PRESSURE: 116 MMHG | DIASTOLIC BLOOD PRESSURE: 72 MMHG

## 2021-06-11 DIAGNOSIS — I25.10 CORONARY ARTERY DISEASE INVOLVING NATIVE CORONARY ARTERY OF NATIVE HEART WITHOUT ANGINA PECTORIS: ICD-10-CM

## 2021-06-11 DIAGNOSIS — I10 ESSENTIAL HYPERTENSION: ICD-10-CM

## 2021-06-11 PROCEDURE — 99207 PR NO CHARGE LOS: CPT | Performed by: INTERNAL MEDICINE

## 2021-06-11 NOTE — PROGRESS NOTES
ALLIED HEALTH BLOOD PRESSURE CHECK     Last office visit: 5/28/21    Previous blood pressure: 149/87 mm Hg  Previous heart rate: 49 bpm      Time of visit: 10:56    Morning medications were taken at: Medications were taken around 6:30 AM     Today's blood pressure: 116/72 mm Hg  Today's heart rate: 60 bpm    Additional Comments: He did not check his BP at home      Results routed to: Dr. Fleming, Team 4      Ordering Provider: Dr. Fleming  In clinic Provider: Dr. Azevedo

## 2021-06-23 DIAGNOSIS — I25.10 CORONARY ARTERY DISEASE INVOLVING NATIVE CORONARY ARTERY OF NATIVE HEART WITHOUT ANGINA PECTORIS: ICD-10-CM

## 2021-06-23 DIAGNOSIS — I10 ESSENTIAL HYPERTENSION: ICD-10-CM

## 2021-06-23 RX ORDER — ATORVASTATIN CALCIUM 80 MG/1
80 TABLET, FILM COATED ORAL DAILY
Qty: 90 TABLET | Refills: 1 | Status: SHIPPED | OUTPATIENT
Start: 2021-06-23 | End: 2021-10-27

## 2021-06-23 RX ORDER — LISINOPRIL 10 MG/1
10 TABLET ORAL DAILY
Qty: 90 TABLET | Refills: 1 | Status: SHIPPED | OUTPATIENT
Start: 2021-06-23 | End: 2021-10-27

## 2021-06-23 RX ORDER — ASPIRIN 81 MG/1
81 TABLET ORAL DAILY
Qty: 90 TABLET | Refills: 1 | Status: SHIPPED | OUTPATIENT
Start: 2021-06-23 | End: 2021-10-27

## 2021-06-23 RX ORDER — CARVEDILOL 3.12 MG/1
3.12 TABLET ORAL 2 TIMES DAILY WITH MEALS
Qty: 180 TABLET | Refills: 1 | Status: SHIPPED | OUTPATIENT
Start: 2021-06-23 | End: 2021-10-27

## 2021-06-23 NOTE — TELEPHONE ENCOUNTER
Last Fill Date: 3.23.21   Last Fill Quantity: 90 days   Last Office Visit:     BP Readings from Last 3 Encounters:   06/11/21 116/72   05/28/21 (!) 149/87   10/04/19 122/80     Lab Results   Component Value Date    AST 18 02/08/2017     Lab Results   Component Value Date    ALT 32 02/08/2017

## 2021-10-27 DIAGNOSIS — I10 ESSENTIAL HYPERTENSION: ICD-10-CM

## 2021-10-27 DIAGNOSIS — I25.10 CORONARY ARTERY DISEASE INVOLVING NATIVE CORONARY ARTERY OF NATIVE HEART WITHOUT ANGINA PECTORIS: ICD-10-CM

## 2021-10-27 RX ORDER — CARVEDILOL 3.12 MG/1
3.12 TABLET ORAL 2 TIMES DAILY WITH MEALS
Qty: 180 TABLET | Refills: 2 | Status: SHIPPED | OUTPATIENT
Start: 2021-10-27 | End: 2022-08-09

## 2021-10-27 RX ORDER — ASPIRIN 81 MG/1
81 TABLET ORAL DAILY
Qty: 90 TABLET | Refills: 2 | Status: SHIPPED | OUTPATIENT
Start: 2021-10-27

## 2021-10-27 RX ORDER — ATORVASTATIN CALCIUM 80 MG/1
80 TABLET, FILM COATED ORAL DAILY
Qty: 90 TABLET | Refills: 2 | Status: SHIPPED | OUTPATIENT
Start: 2021-10-27 | End: 2022-08-09

## 2021-10-27 RX ORDER — LISINOPRIL 10 MG/1
10 TABLET ORAL DAILY
Qty: 90 TABLET | Refills: 2 | Status: SHIPPED | OUTPATIENT
Start: 2021-10-27 | End: 2022-08-09

## 2021-11-16 ENCOUNTER — OFFICE VISIT (OUTPATIENT)
Dept: INTERNAL MEDICINE | Facility: CLINIC | Age: 56
End: 2021-11-16
Payer: COMMERCIAL

## 2021-11-16 VITALS
TEMPERATURE: 96.8 F | DIASTOLIC BLOOD PRESSURE: 84 MMHG | OXYGEN SATURATION: 99 % | BODY MASS INDEX: 25.07 KG/M2 | WEIGHT: 165.4 LBS | HEART RATE: 53 BPM | HEIGHT: 68 IN | SYSTOLIC BLOOD PRESSURE: 120 MMHG

## 2021-11-16 DIAGNOSIS — E78.5 HYPERLIPIDEMIA LDL GOAL <70: ICD-10-CM

## 2021-11-16 DIAGNOSIS — I25.10 CORONARY ARTERY DISEASE INVOLVING NATIVE CORONARY ARTERY OF NATIVE HEART WITHOUT ANGINA PECTORIS: ICD-10-CM

## 2021-11-16 DIAGNOSIS — I10 ESSENTIAL HYPERTENSION: ICD-10-CM

## 2021-11-16 DIAGNOSIS — R73.9 HYPERGLYCEMIA: ICD-10-CM

## 2021-11-16 DIAGNOSIS — Z11.59 ENCOUNTER FOR HEPATITIS C SCREENING TEST FOR LOW RISK PATIENT: ICD-10-CM

## 2021-11-16 DIAGNOSIS — Z12.5 SCREENING FOR PROSTATE CANCER: ICD-10-CM

## 2021-11-16 DIAGNOSIS — Z12.11 ENCOUNTER FOR COLORECTAL CANCER SCREENING: ICD-10-CM

## 2021-11-16 DIAGNOSIS — Z23 HIGH PRIORITY FOR 2019-NCOV VACCINE: ICD-10-CM

## 2021-11-16 DIAGNOSIS — Z00.00 ROUTINE GENERAL MEDICAL EXAMINATION AT A HEALTH CARE FACILITY: Primary | ICD-10-CM

## 2021-11-16 DIAGNOSIS — Z12.12 ENCOUNTER FOR COLORECTAL CANCER SCREENING: ICD-10-CM

## 2021-11-16 LAB
ALBUMIN SERPL-MCNC: 3.9 G/DL (ref 3.4–5)
ALP SERPL-CCNC: 74 U/L (ref 40–150)
ALT SERPL W P-5'-P-CCNC: 37 U/L (ref 0–70)
ANION GAP SERPL CALCULATED.3IONS-SCNC: <1 MMOL/L (ref 3–14)
AST SERPL W P-5'-P-CCNC: 13 U/L (ref 0–45)
BILIRUB SERPL-MCNC: 0.3 MG/DL (ref 0.2–1.3)
BUN SERPL-MCNC: 19 MG/DL (ref 7–30)
CALCIUM SERPL-MCNC: 9.1 MG/DL (ref 8.5–10.1)
CHLORIDE BLD-SCNC: 110 MMOL/L (ref 94–109)
CHOLEST SERPL-MCNC: 109 MG/DL
CO2 SERPL-SCNC: 31 MMOL/L (ref 20–32)
CREAT SERPL-MCNC: 0.92 MG/DL (ref 0.66–1.25)
FASTING STATUS PATIENT QL REPORTED: YES
GFR SERPL CREATININE-BSD FRML MDRD: >90 ML/MIN/1.73M2
GLUCOSE BLD-MCNC: 100 MG/DL (ref 70–99)
HBA1C MFR BLD: 5.7 % (ref 0–5.6)
HCV AB SERPL QL IA: NONREACTIVE
HDLC SERPL-MCNC: 50 MG/DL
LDLC SERPL CALC-MCNC: 37 MG/DL
NONHDLC SERPL-MCNC: 59 MG/DL
POTASSIUM BLD-SCNC: 4.4 MMOL/L (ref 3.4–5.3)
PROT SERPL-MCNC: 7.9 G/DL (ref 6.8–8.8)
PSA SERPL-MCNC: 0.15 UG/L (ref 0–4)
SODIUM SERPL-SCNC: 140 MMOL/L (ref 133–144)
TRIGL SERPL-MCNC: 109 MG/DL

## 2021-11-16 PROCEDURE — 99396 PREV VISIT EST AGE 40-64: CPT | Mod: 25 | Performed by: INTERNAL MEDICINE

## 2021-11-16 PROCEDURE — 83036 HEMOGLOBIN GLYCOSYLATED A1C: CPT | Performed by: INTERNAL MEDICINE

## 2021-11-16 PROCEDURE — 36415 COLL VENOUS BLD VENIPUNCTURE: CPT | Performed by: INTERNAL MEDICINE

## 2021-11-16 PROCEDURE — 91300 COVID-19,PF,PFIZER (12+ YRS): CPT | Performed by: INTERNAL MEDICINE

## 2021-11-16 PROCEDURE — G0103 PSA SCREENING: HCPCS | Performed by: INTERNAL MEDICINE

## 2021-11-16 PROCEDURE — 0004A COVID-19,PF,PFIZER (12+ YRS): CPT | Performed by: INTERNAL MEDICINE

## 2021-11-16 PROCEDURE — 80053 COMPREHEN METABOLIC PANEL: CPT | Performed by: INTERNAL MEDICINE

## 2021-11-16 PROCEDURE — 86803 HEPATITIS C AB TEST: CPT | Performed by: INTERNAL MEDICINE

## 2021-11-16 PROCEDURE — 80061 LIPID PANEL: CPT | Performed by: INTERNAL MEDICINE

## 2021-11-16 RX ORDER — ASPIRIN 81 MG/1
81 TABLET ORAL DAILY
Qty: 90 TABLET | Refills: 2 | OUTPATIENT
Start: 2021-11-16

## 2021-11-16 ASSESSMENT — ENCOUNTER SYMPTOMS
COUGH: 0
WEAKNESS: 0
HEARTBURN: 1
FREQUENCY: 0
CHILLS: 0
HEMATURIA: 0
CONSTIPATION: 0
NAUSEA: 0
FEVER: 0
PALPITATIONS: 0
DYSURIA: 0
SORE THROAT: 0
DIARRHEA: 0
JOINT SWELLING: 0
ARTHRALGIAS: 1
PARESTHESIAS: 0
NERVOUS/ANXIOUS: 0
ABDOMINAL PAIN: 0
DIZZINESS: 0
HEADACHES: 0
SHORTNESS OF BREATH: 0
EYE PAIN: 0
MYALGIAS: 0

## 2021-11-16 ASSESSMENT — MIFFLIN-ST. JEOR: SCORE: 1551.81

## 2021-11-16 NOTE — PROGRESS NOTES
SUBJECTIVE:   CC: Viviana Gilman is an 56 year old male who presents for preventative health visit.     Patient has been advised of split billing requirements and indicates understanding: Yes     Healthy Habits:     Getting at least 3 servings of Calcium per day:  Yes    Bi-annual eye exam:  Yes    Dental care twice a year:  Yes    Sleep apnea or symptoms of sleep apnea:  Excessive snoring    Diet:  Low fat/cholesterol and Breakfast skipped    Frequency of exercise:  1 day/week    Duration of exercise:  15-30 minutes    Taking medications regularly:  Yes    Medication side effects:  None    PHQ-2 Total Score: 0    Additional concerns today:  No    Viviana presents today for a physical exam. He has no acute complaints today. He answered yes to chest pain on the intake but denies it to me today when I ask more about it. He would like his medications transferred to the [facility] pharmacy. I did discuss this request with our pharmacist today and he will get them transferred.    Today's PHQ-2 Score:   PHQ-2 ( 1999 Pfizer) 11/16/2021   Q1: Little interest or pleasure in doing things 0   Q2: Feeling down, depressed or hopeless 0   PHQ-2 Score 0   Q1: Little interest or pleasure in doing things Not at all   Q2: Feeling down, depressed or hopeless Not at all   PHQ-2 Score 0     Abuse: Current or Past(Physical, Sexual or Emotional)- No  Do you feel safe in your environment? Yes    Social History     Tobacco Use     Smoking status: Never Smoker     Smokeless tobacco: Never Used   Substance Use Topics     Alcohol use: Not Currently     Alcohol/week: 0.0 standard drinks     Comment: Occaisionally     If you drink alcohol do you typically have >3 drinks per day or >7 drinks per week? No    Alcohol Use 11/16/2021   Prescreen: >3 drinks/day or >7 drinks/week? No   Prescreen: >3 drinks/day or >7 drinks/week? -     Last PSA:   Prostate Specific Antigen Screen   Date Value Ref Range Status   11/16/2021 0.15 0.00 - 4.00 ug/L Final  "      Reviewed orders with patient. Reviewed health maintenance and updated orders accordingly - Yes    Labs reviewed in EPIC    Reviewed and updated as needed this visit by clinical staff  Tobacco  Allergies  Meds  Problems  Med Hx  Surg Hx  Fam Hx       Reviewed and updated as needed this visit by Provider  Tobacco  Allergies  Meds  Problems  Med Hx  Surg Hx  Fam Hx        Review of Systems   Constitutional: Negative for chills and fever.   HENT: Negative for congestion, ear pain, hearing loss and sore throat.    Eyes: Negative for pain and visual disturbance.   Respiratory: Negative for cough and shortness of breath.    Cardiovascular: Positive for chest pain. Negative for palpitations and peripheral edema.   Gastrointestinal: Positive for heartburn. Negative for abdominal pain, constipation, diarrhea and nausea.   Genitourinary: Negative for dysuria, frequency, genital sores, hematuria, impotence, penile discharge and urgency.   Musculoskeletal: Positive for arthralgias. Negative for joint swelling and myalgias.   Skin: Negative for rash.   Neurological: Negative for dizziness, weakness, headaches and paresthesias.   Psychiatric/Behavioral: Negative for mood changes. The patient is not nervous/anxious.      OBJECTIVE:   /84   Pulse 53   Temp 96.8  F (36  C) (Tympanic)   Ht 1.715 m (5' 7.5\")   Wt 75 kg (165 lb 6.4 oz)   SpO2 99%   BMI 25.52 kg/m      Physical Exam  GENERAL: In no distress.  EYES: Conjunctivae/corneas clear. EOMs grossly intact.  HENT: NC/AT, facies symmetric. Neck supple.  RESP: CTAB. No w/r/r.  CV: RRR, no m/r/g.  GI: NT, ND, without rebound or guarding, no CVA tenderness  MSK: Moves all four extremities freely.  SKIN: No significant ulcers, lesions or rashes on the visualized portions of the skin  NEURO: Alert. Oriented.  PSYCH: Linear thought process. Speech normal rate and volume. No tangential thoughts, hallucinations, or delusions.    Diagnostic Test Results: Labs " reviewed in Epic    ASSESSMENT/PLAN:   Routine general medical examination at a health care facility  Reviewed PMH. Discussed healthcare maintenance issues, including cancer screenings (colonoscopies, PSA), relevant immunizations, and cardiac risk factor screenings such as for cholesterol, HTN, and DM.    Essential hypertension  BP at goal today. Overdue for labs. Continue current regimen as prescribed by cardiology.  - Comprehensive metabolic panel    Coronary artery disease involving native coronary artery of native heart without angina pectoris  Follows with cardiology. Will transfer his scripts (prescribed by cardiology) to our pharmacy today. Defer cares to cards.    Hyperglycemia  Seen on past labs. Will check A1c today.  - Hemoglobin A1c    Hyperlipidemia LDL goal <70  Overdue for lipid panel. Tolerating atorvastatin well.  - Lipid Profile    Screening for prostate cancer  No known FH.  - Prostate Specific Antigen Screen    Encounter for colorectal cancer screening  Reports colonoscopy ~5 years ago and was apparently told either a 2 year or a 5 year follow-up. I do not have access to these records. Referral placed as it does sound like he's due for another.  - Adult Gastro Ref - Procedure Only; Future    Encounter for hepatitis C screening test for low risk patient  One-time CDC-recommended test as part of comprehensive preventative care.  - Hepatitis C Screen Reflex to HCV RNA Quant and Genotype    High priority for 2019-nCoV vaccine  Third dose booster.  - COVID-19,PF,PFIZER (12+ Yrs PURPLE LABEL)    Patient has been advised of split billing requirements and indicates understanding: Yes     COUNSELING:   Special attention given to:        Regular exercise       Healthy diet/nutrition       Immunizations    Vaccinated for: COVID and Declined: Influenza and Pneumococcal        Consider Hep C screening for all patients one time for ages 18-79 years       Colon cancer screening       Prostate cancer  "screening    Estimated body mass index is 25.52 kg/m  as calculated from the following:    Height as of this encounter: 1.715 m (5' 7.5\").    Weight as of this encounter: 75 kg (165 lb 6.4 oz).    He reports that he has never smoked. He has never used smokeless tobacco.      Robb Faulkner MD  [facility]  "

## 2021-11-16 NOTE — LETTER
[facility]  [address] [address]  Phone: [phone]   11/17/2021      Viviana Gilman  [address] [facility] [address]        Dear Mr. Viviana Gilman:    I am writing to inform you of the results of the laboratory tests you had done recently. Your electrolytes and kidneys look good. Your liver looks good. Your cholesterol is mildly elevated. Keep taking your statin medication! Your hemoglobin A1c is in the pre-diabete range, indicating you have pre-diabetes. This means that while you do not have diabetes right now, you are at an elevated risk to develop it in the future. Weight loss can be very beneficial at this stage in order to help prevent the progression to diabetes and I encourage you to work on losing weight through dietary changes such as smaller portions, cutting out excessive sweets, etc. Your one-time hepatitis C screening test is negative. Your prostate cancer screening blood test is normal.    Thank you for allowing me to participate in your care. If you have any further questions or problems, please contact me via our nurse line at [phone]. An even easier way to get ahold of myself or my team is through [employer], which you can sign up for at [email or web address]. MyChart is not only a great way to communicate with us, but also allows you to see your full results.      Sincerely,        Robb Faulkner MD, MPH  Department of Internal Medicine  [facility]

## 2021-11-16 NOTE — PATIENT INSTRUCTIONS
- Our team will contact you via [employer] (if you sign up for it), telephone call (if results are urgent), or otherwise via letter in the mail with the results of today's lab tests once I have a chance to review them  - Make an appointment with our pharmacy downstairs or stop by your preferred pharmacy to discuss obtaining the Shingrix (shingles) vaccine series

## 2022-08-09 DIAGNOSIS — I25.10 CORONARY ARTERY DISEASE INVOLVING NATIVE CORONARY ARTERY OF NATIVE HEART WITHOUT ANGINA PECTORIS: ICD-10-CM

## 2022-08-09 DIAGNOSIS — I10 ESSENTIAL HYPERTENSION: ICD-10-CM

## 2022-08-09 RX ORDER — LISINOPRIL 10 MG/1
10 TABLET ORAL DAILY
Qty: 90 TABLET | Refills: 1 | Status: SHIPPED | OUTPATIENT
Start: 2022-08-09 | End: 2023-03-06

## 2022-08-09 RX ORDER — ATORVASTATIN CALCIUM 80 MG/1
80 TABLET, FILM COATED ORAL DAILY
Qty: 90 TABLET | Refills: 1 | Status: SHIPPED | OUTPATIENT
Start: 2022-08-09 | End: 2023-02-03

## 2022-08-09 RX ORDER — CARVEDILOL 3.12 MG/1
3.12 TABLET ORAL 2 TIMES DAILY WITH MEALS
Qty: 180 TABLET | Refills: 1 | Status: SHIPPED | OUTPATIENT
Start: 2022-08-09 | End: 2022-11-03

## 2022-11-02 DIAGNOSIS — I10 ESSENTIAL HYPERTENSION: ICD-10-CM

## 2022-11-02 DIAGNOSIS — I25.10 CORONARY ARTERY DISEASE INVOLVING NATIVE CORONARY ARTERY OF NATIVE HEART WITHOUT ANGINA PECTORIS: ICD-10-CM

## 2022-11-02 NOTE — TELEPHONE ENCOUNTER
Received request from pharmacy for refill of Carvedilol 3.125mg twice a day.    Last OV   5\28\21  Labs 11\16\21    [facility] Cardiology Refill Guideline reviewed.  Medication does not meet criteria for refill due to no office visit for over a year.  Messaged to providers team for further review.

## 2022-11-03 RX ORDER — CARVEDILOL 3.12 MG/1
3.12 TABLET ORAL 2 TIMES DAILY WITH MEALS
Qty: 180 TABLET | Refills: 0 | Status: SHIPPED | OUTPATIENT
Start: 2022-11-03 | End: 2023-03-08

## 2023-02-03 DIAGNOSIS — I25.10 CORONARY ARTERY DISEASE INVOLVING NATIVE CORONARY ARTERY OF NATIVE HEART WITHOUT ANGINA PECTORIS: ICD-10-CM

## 2023-02-03 RX ORDER — ATORVASTATIN CALCIUM 80 MG/1
80 TABLET, FILM COATED ORAL DAILY
Qty: 90 TABLET | Refills: 0 | Status: SHIPPED | OUTPATIENT
Start: 2023-02-03 | End: 2023-03-28

## 2023-02-03 NOTE — TELEPHONE ENCOUNTER
Writer attempted to call patient, but his phone voicemail is full.  The phone number listed as a home phone, goes to someone else.    I refilled for 90 days.  I mailed letter explaining this.    Megan Weller RN on 2/3/2023 at 4:54 PM

## 2023-02-03 NOTE — TELEPHONE ENCOUNTER
[facility] Cardiology Refill Guideline reviewed.  Medication does not meet criteria for refill due to patient is due for an appointment.  Messaged to providers team for further review.

## 2023-02-13 DIAGNOSIS — I10 ESSENTIAL HYPERTENSION: ICD-10-CM

## 2023-02-13 DIAGNOSIS — I25.10 CORONARY ARTERY DISEASE INVOLVING NATIVE CORONARY ARTERY OF NATIVE HEART WITHOUT ANGINA PECTORIS: ICD-10-CM

## 2023-02-13 RX ORDER — CARVEDILOL 3.12 MG/1
3.12 TABLET ORAL 2 TIMES DAILY WITH MEALS
Qty: 180 TABLET | Refills: 0 | OUTPATIENT
Start: 2023-02-13

## 2023-02-13 NOTE — TELEPHONE ENCOUNTER
Received refill request for:  Carvedilol    [facility] Cardiology Refill Guideline reviewed.  Medication does not meet criteria for refill due to overude for follow up.  Messaged to providers team for further review.       Irma Sutton RN, BSN  02/13/23 at 12:19 PM

## 2023-02-17 DIAGNOSIS — I25.10 CORONARY ARTERY DISEASE INVOLVING NATIVE CORONARY ARTERY OF NATIVE HEART WITHOUT ANGINA PECTORIS: ICD-10-CM

## 2023-02-17 DIAGNOSIS — I10 ESSENTIAL HYPERTENSION: ICD-10-CM

## 2023-02-17 RX ORDER — LISINOPRIL 10 MG/1
10 TABLET ORAL DAILY
Qty: 90 TABLET | Refills: 1 | OUTPATIENT
Start: 2023-02-17

## 2023-02-17 RX ORDER — CARVEDILOL 3.12 MG/1
3.12 TABLET ORAL 2 TIMES DAILY WITH MEALS
Qty: 180 TABLET | Refills: 0 | OUTPATIENT
Start: 2023-02-17

## 2023-02-17 NOTE — TELEPHONE ENCOUNTER
Pending Prescriptions:                       Disp   Refills    lisinopril (ZESTRIL) 10 MG tablet         90 tab*1            Sig: Take 1 tablet (10 mg) by mouth daily    carvedilol (COREG) 3.125 MG tablet        180 ta*0            Sig: Take 1 tablet (3.125 mg) by mouth 2 times daily           (with meals)    Both last filled 11-7-22 for a 90 day supply    Thank you,  Macy Daniel [facility]  [phone]

## 2023-03-06 RX ORDER — LISINOPRIL 10 MG/1
10 TABLET ORAL DAILY
Qty: 90 TABLET | Refills: 1 | Status: SHIPPED | OUTPATIENT
Start: 2023-03-06

## 2023-03-28 ENCOUNTER — OFFICE VISIT (OUTPATIENT)
Dept: INTERNAL MEDICINE | Facility: CLINIC | Age: 58
End: 2023-03-28
Payer: COMMERCIAL

## 2023-03-28 VITALS
BODY MASS INDEX: 25.54 KG/M2 | HEIGHT: 68 IN | HEART RATE: 62 BPM | SYSTOLIC BLOOD PRESSURE: 108 MMHG | RESPIRATION RATE: 16 BRPM | TEMPERATURE: 97.8 F | DIASTOLIC BLOOD PRESSURE: 70 MMHG | WEIGHT: 168.5 LBS | OXYGEN SATURATION: 98 %

## 2023-03-28 DIAGNOSIS — E78.5 HYPERLIPIDEMIA LDL GOAL <70: ICD-10-CM

## 2023-03-28 DIAGNOSIS — Z12.11 ENCOUNTER FOR COLORECTAL CANCER SCREENING: ICD-10-CM

## 2023-03-28 DIAGNOSIS — Z12.5 SCREENING FOR PROSTATE CANCER: ICD-10-CM

## 2023-03-28 DIAGNOSIS — I10 ESSENTIAL HYPERTENSION: ICD-10-CM

## 2023-03-28 DIAGNOSIS — Z00.00 ROUTINE GENERAL MEDICAL EXAMINATION AT A HEALTH CARE FACILITY: Primary | ICD-10-CM

## 2023-03-28 DIAGNOSIS — Z23 HIGH PRIORITY FOR 2019-NCOV VACCINE: ICD-10-CM

## 2023-03-28 DIAGNOSIS — Z12.12 ENCOUNTER FOR COLORECTAL CANCER SCREENING: ICD-10-CM

## 2023-03-28 LAB
ALT SERPL W P-5'-P-CCNC: 35 U/L (ref 10–50)
ANION GAP SERPL CALCULATED.3IONS-SCNC: 12 MMOL/L (ref 7–15)
BUN SERPL-MCNC: 20.5 MG/DL (ref 6–20)
CALCIUM SERPL-MCNC: 8.9 MG/DL (ref 8.6–10)
CHLORIDE SERPL-SCNC: 108 MMOL/L (ref 98–107)
CHOLEST SERPL-MCNC: 107 MG/DL
CREAT SERPL-MCNC: 0.97 MG/DL (ref 0.67–1.17)
DEPRECATED HCO3 PLAS-SCNC: 24 MMOL/L (ref 22–29)
GFR SERPL CREATININE-BSD FRML MDRD: >90 ML/MIN/1.73M2
GLUCOSE SERPL-MCNC: 103 MG/DL (ref 70–99)
HDLC SERPL-MCNC: 41 MG/DL
LDLC SERPL CALC-MCNC: 55 MG/DL
NONHDLC SERPL-MCNC: 66 MG/DL
POTASSIUM SERPL-SCNC: 3.9 MMOL/L (ref 3.4–5.3)
PSA SERPL DL<=0.01 NG/ML-MCNC: 0.15 NG/ML (ref 0–3.5)
SODIUM SERPL-SCNC: 144 MMOL/L (ref 136–145)
TRIGL SERPL-MCNC: 57 MG/DL

## 2023-03-28 PROCEDURE — 99396 PREV VISIT EST AGE 40-64: CPT | Mod: 25 | Performed by: INTERNAL MEDICINE

## 2023-03-28 PROCEDURE — 0124A COVID-19 VACCINE BIVALENT BOOSTER 12+ (PFIZER): CPT | Performed by: INTERNAL MEDICINE

## 2023-03-28 PROCEDURE — 80048 BASIC METABOLIC PNL TOTAL CA: CPT | Performed by: INTERNAL MEDICINE

## 2023-03-28 PROCEDURE — 36415 COLL VENOUS BLD VENIPUNCTURE: CPT | Performed by: INTERNAL MEDICINE

## 2023-03-28 PROCEDURE — 91312 COVID-19 VACCINE BIVALENT BOOSTER 12+ (PFIZER): CPT | Performed by: INTERNAL MEDICINE

## 2023-03-28 PROCEDURE — 84460 ALANINE AMINO (ALT) (SGPT): CPT | Performed by: INTERNAL MEDICINE

## 2023-03-28 PROCEDURE — 80061 LIPID PANEL: CPT | Performed by: INTERNAL MEDICINE

## 2023-03-28 PROCEDURE — G0103 PSA SCREENING: HCPCS | Performed by: INTERNAL MEDICINE

## 2023-03-28 RX ORDER — ATORVASTATIN CALCIUM 80 MG/1
80 TABLET, FILM COATED ORAL DAILY
Qty: 90 TABLET | Refills: 4 | Status: SHIPPED | OUTPATIENT
Start: 2023-03-28 | End: 2024-05-31

## 2023-03-28 RX ORDER — ATORVASTATIN CALCIUM 80 MG/1
80 TABLET, FILM COATED ORAL DAILY
Qty: 90 TABLET | Refills: 0 | Status: SHIPPED | OUTPATIENT
Start: 2023-03-28 | End: 2023-03-28

## 2023-03-28 ASSESSMENT — ENCOUNTER SYMPTOMS
HEADACHES: 0
DIZZINESS: 0
HEARTBURN: 0
SHORTNESS OF BREATH: 0
MYALGIAS: 0
WEAKNESS: 0
JOINT SWELLING: 0
DYSURIA: 0
CONSTIPATION: 0
DIARRHEA: 0
HEMATOCHEZIA: 0
NERVOUS/ANXIOUS: 0
PALPITATIONS: 0
ARTHRALGIAS: 1
ABDOMINAL PAIN: 0
EYE PAIN: 0
CHILLS: 0
FEVER: 0
NAUSEA: 0
COUGH: 0
PARESTHESIAS: 0
HEMATURIA: 0
FREQUENCY: 0
SORE THROAT: 0

## 2023-03-28 NOTE — LETTER
[facility]  [address] [address]  Phone: [phone]   3/29/2023      Viviana Gilman  [address] [facility] [address]        Dear Mr. Viviana Gilman:    I am writing to inform you of the results of the laboratory tests you had done recently. Your electrolytes and kidneys look good. Your liver looks good. Your cholesterol is normal. Your PSA (prostate cancer screening test) is normal. Continue your current medications - it looks like they're working!    Remember to return that stool kit if you haven't already so we can screen you for colon cancer.    If you have any further questions or problems, please contact our nurse line at [phone]. An even easier way to get ahold of our team is through [employer], which you can sign up for at [email or web address]. MyChart is not only a great way to communicate with us, but also allows you to see your full results.        Sincerely,        Robb Faulkner MD, MPH  Department of Internal Medicine  [facility]

## 2023-03-28 NOTE — PROGRESS NOTES
SUBJECTIVE:   CC: Viviana is an 57 year old who presents for preventative health visit.   Patient has been advised of split billing requirements and indicates understanding: Yes  Healthy Habits:     Getting at least 3 servings of Calcium per day:  Yes    Bi-annual eye exam:  Yes    Dental care twice a year:  Yes    Sleep apnea or symptoms of sleep apnea:  Excessive snoring    Diet:  Regular (no restrictions), Low fat/cholesterol, Vegetarian/vegan and Gluten-free/reduced    Frequency of exercise:  2-3 days/week    Duration of exercise:  15-30 minutes    Taking medications regularly:  Yes    Medication side effects:  Muscle aches    PHQ-2 Total Score: 0    Additional concerns today:  No    Viviana presents today for a physical exam. He has no acute concerns.     Today's PHQ-2 Score:   PHQ-2 ( 1999 Pfizer) 3/28/2023   Q1: Little interest or pleasure in doing things 0   Q2: Feeling down, depressed or hopeless 0   PHQ-2 Score 0   PHQ-2 Total Score (12-17 Years)- Positive if 3 or more points; Administer PHQ-A if positive -   Q1: Little interest or pleasure in doing things Not at all   Q2: Feeling down, depressed or hopeless Not at all   PHQ-2 Score 0     Have you ever done Advance Care Planning? (For example, a Health Directive, POLST, or a discussion with a medical provider or your loved ones about your wishes): No, advance care planning information given to patient to review.  Patient declined advance care planning discussion at this time.    Social History     Tobacco Use     Smoking status: Never     Smokeless tobacco: Never   Substance Use Topics     Alcohol use: Not Currently     Alcohol/week: 0.0 standard drinks     Comment: Occaisionally     Alcohol Use 3/28/2023   Prescreen: >3 drinks/day or >7 drinks/week? No     Last PSA:   Prostate Specific Antigen Screen   Date Value Ref Range Status   11/16/2021 0.15 0.00 - 4.00 ug/L Final     Reviewed orders with patient. Reviewed health maintenance and updated orders accordingly  "- Yes    Labs reviewed in EPIC    Reviewed and updated as needed this visit by clinical staff   Tobacco  Allergies  Meds  Problems  Med Hx  Surg Hx  Fam Hx        Reviewed and updated as needed this visit by Provider   Tobacco  Allergies  Meds  Problems  Med Hx  Surg Hx  Fam Hx         Review of Systems   Constitutional: Negative for chills and fever.   HENT: Negative for congestion, ear pain, hearing loss and sore throat.    Eyes: Negative for pain and visual disturbance.   Respiratory: Negative for cough and shortness of breath.    Cardiovascular: Negative for chest pain, palpitations and peripheral edema.   Gastrointestinal: Negative for abdominal pain, constipation, diarrhea, heartburn, hematochezia and nausea.   Genitourinary: Negative for dysuria, frequency, genital sores, hematuria, impotence, penile discharge and urgency.   Musculoskeletal: Positive for arthralgias. Negative for joint swelling and myalgias.   Skin: Negative for rash.   Neurological: Negative for dizziness, weakness, headaches and paresthesias.   Psychiatric/Behavioral: Negative for mood changes. The patient is not nervous/anxious.      OBJECTIVE:   /70   Pulse 62   Temp 97.8  F (36.6  C) (Tympanic)   Resp 16   Ht 1.715 m (5' 7.5\")   Wt 76.4 kg (168 lb 8 oz)   SpO2 98%   BMI 26.00 kg/m      Physical Exam  GENERAL: In no distress.  EYES: Conjunctivae/corneas clear. EOMs grossly intact.  HENT: NC/AT, facies symmetric. Neck supple. No LAD or thyromegaly noted.  RESP: CTAB. No w/r/r.  CV: RRR, no m/r/g.  GI: NT, ND, without rebound or guarding, no CVA tenderness, no hepatomegaly appreciated.  MSK: Moves all four extremities freely.  SKIN: No significant ulcers, lesions or rashes on the visualized portions of the skin  NEURO: Alert. Oriented.  PSYCH: Linear thought process. Speech normal rate and volume. No tangential thoughts, hallucinations, or delusions.    Diagnostic Test Results: Labs reviewed in " Epic    ASSESSMENT/PLAN:   Routine general medical examination at a health care facility  Reviewed PMH. Discussed healthcare maintenance issues, including cancer screenings (updating as below), relevant immunizations (COVID booster today), and cardiac risk factor screenings such as for cholesterol, HTN, and DM.    Essential hypertension  BP at goal. Encouraged him to re-establish with cardiology. Clinic number given for him to call to schedule.  - Basic metabolic panel; Future    Hyperlipidemia LDL goal <70  Fasting labs today. Refilled atorvastatin.  - Lipid Profile; Future  - ALT; Future  - atorvastatin (LIPITOR) 80 MG tablet; Take 1 tablet (80 mg) by mouth daily    Screening for prostate cancer  PSA WNL in past.  - Prostate Specific Antigen Screen; Future    Encounter for colorectal cancer screening  He'd like to do FIT over colonoscopy. Discussed that if this is positive then will pursue colonoscopy, he was in agreement.  - Fecal colorectal cancer screen FIT; Future    High priority for 2019-nCoV vaccine  Bivalent booster.  - COVID-19,PF,PFIZER BOOSTER BIVALENT 12+Yrs    COUNSELING:   Reviewed preventive health counseling, as reflected in patient instructions    He reports that he has never smoked. He has never used smokeless tobacco.    Robb Faulkner MD  [facility]

## 2023-03-28 NOTE — PATIENT INSTRUCTIONS
- Our team will contact you via [employer] (if you sign up for it), telephone call (if results are urgent), or otherwise via letter in the mail with the results of today's lab tests once I have a chance to review them  - Make an appointment with our pharmacy downstairs or stop by your preferred pharmacy to discuss obtaining the pneumonia vaccine  - Return stool kit so we can screen for colon cancer    - Please call cardiology clinic at [phone] to schedule your appointment with Dr. Amauri Fleming or one of his partners as soon as possible.

## 2023-03-28 NOTE — LETTER
[facility]  [address] [address]  Phone: [phone]   5/2/2023      Viviana Jones Mukul  [address] [place] [id number]        Dear Mr. Michelle Nmsaundra Mukul:    Your colon cancer screening (FIT) test was NEGATIVE.          Sincerely,        Robb Faulkner MD, MPH  Department of Internal Medicine  [facility]

## 2023-04-27 PROCEDURE — 82274 ASSAY TEST FOR BLOOD FECAL: CPT | Performed by: INTERNAL MEDICINE

## 2023-05-02 LAB — HEMOCCULT STL QL IA: NEGATIVE

## 2023-09-13 DIAGNOSIS — I10 ESSENTIAL HYPERTENSION: ICD-10-CM

## 2023-09-13 RX ORDER — LISINOPRIL 10 MG/1
10 TABLET ORAL DAILY
Qty: 90 TABLET | Refills: 1 | OUTPATIENT
Start: 2023-09-13

## 2023-09-13 NOTE — TELEPHONE ENCOUNTER
Pending Prescriptions:                       Disp   Refills    lisinopril (ZESTRIL) 10 MG tablet         90 tab*1            Sig: Take 1 tablet (10 mg) by mouth daily    Last filled 6-8-23    Thank you,  Macy Daniel [facility]  [phone]

## 2024-02-27 ENCOUNTER — PATIENT OUTREACH (OUTPATIENT)
Dept: CARE COORDINATION | Facility: CLINIC | Age: 59
End: 2024-02-27
Payer: COMMERCIAL

## 2024-05-31 ENCOUNTER — ANCILLARY PROCEDURE (OUTPATIENT)
Dept: GENERAL RADIOLOGY | Facility: CLINIC | Age: 59
End: 2024-05-31
Attending: INTERNAL MEDICINE
Payer: COMMERCIAL

## 2024-05-31 ENCOUNTER — ORDERS ONLY (AUTO-RELEASED) (OUTPATIENT)
Dept: INTERNAL MEDICINE | Facility: CLINIC | Age: 59
End: 2024-05-31

## 2024-05-31 ENCOUNTER — OFFICE VISIT (OUTPATIENT)
Dept: INTERNAL MEDICINE | Facility: CLINIC | Age: 59
End: 2024-05-31
Payer: COMMERCIAL

## 2024-05-31 VITALS
HEART RATE: 55 BPM | OXYGEN SATURATION: 100 % | SYSTOLIC BLOOD PRESSURE: 118 MMHG | DIASTOLIC BLOOD PRESSURE: 72 MMHG | WEIGHT: 177.6 LBS | HEIGHT: 67 IN | TEMPERATURE: 96.8 F | BODY MASS INDEX: 27.88 KG/M2

## 2024-05-31 DIAGNOSIS — M25.562 ACUTE PAIN OF LEFT KNEE: ICD-10-CM

## 2024-05-31 DIAGNOSIS — Z91.148 NONCOMPLIANCE WITH MEDICATION REGIMEN: ICD-10-CM

## 2024-05-31 DIAGNOSIS — Z12.11 SCREEN FOR COLON CANCER: ICD-10-CM

## 2024-05-31 DIAGNOSIS — I25.10 CORONARY ARTERY DISEASE INVOLVING NATIVE CORONARY ARTERY OF NATIVE HEART WITHOUT ANGINA PECTORIS: ICD-10-CM

## 2024-05-31 DIAGNOSIS — Z12.5 SCREENING FOR PROSTATE CANCER: ICD-10-CM

## 2024-05-31 DIAGNOSIS — Z23 ENCOUNTER FOR IMMUNIZATION: ICD-10-CM

## 2024-05-31 DIAGNOSIS — E78.5 HYPERLIPIDEMIA LDL GOAL <70: ICD-10-CM

## 2024-05-31 DIAGNOSIS — Z00.00 ROUTINE GENERAL MEDICAL EXAMINATION AT A HEALTH CARE FACILITY: Primary | ICD-10-CM

## 2024-05-31 DIAGNOSIS — Z63.79 STRESSFUL LIFE EVENT AFFECTING FAMILY: ICD-10-CM

## 2024-05-31 DIAGNOSIS — I10 ESSENTIAL HYPERTENSION: ICD-10-CM

## 2024-05-31 DIAGNOSIS — R73.03 PREDIABETES: ICD-10-CM

## 2024-05-31 LAB
BASOPHILS # BLD AUTO: 0 10E3/UL (ref 0–0.2)
BASOPHILS NFR BLD AUTO: 0 %
EOSINOPHIL # BLD AUTO: 0.2 10E3/UL (ref 0–0.7)
EOSINOPHIL NFR BLD AUTO: 4 %
ERYTHROCYTE [DISTWIDTH] IN BLOOD BY AUTOMATED COUNT: 14 % (ref 10–15)
HBA1C MFR BLD: 5.8 % (ref 0–5.6)
HCT VFR BLD AUTO: 43.1 % (ref 40–53)
HGB BLD-MCNC: 13.9 G/DL (ref 13.3–17.7)
IMM GRANULOCYTES # BLD: 0 10E3/UL
IMM GRANULOCYTES NFR BLD: 0 %
LYMPHOCYTES # BLD AUTO: 2.2 10E3/UL (ref 0.8–5.3)
LYMPHOCYTES NFR BLD AUTO: 43 %
MCH RBC QN AUTO: 27.4 PG (ref 26.5–33)
MCHC RBC AUTO-ENTMCNC: 32.3 G/DL (ref 31.5–36.5)
MCV RBC AUTO: 85 FL (ref 78–100)
MONOCYTES # BLD AUTO: 0.4 10E3/UL (ref 0–1.3)
MONOCYTES NFR BLD AUTO: 8 %
NEUTROPHILS # BLD AUTO: 2.3 10E3/UL (ref 1.6–8.3)
NEUTROPHILS NFR BLD AUTO: 45 %
PLATELET # BLD AUTO: 135 10E3/UL (ref 150–450)
RBC # BLD AUTO: 5.08 10E6/UL (ref 4.4–5.9)
WBC # BLD AUTO: 5 10E3/UL (ref 4–11)

## 2024-05-31 PROCEDURE — 91320 SARSCV2 VAC 30MCG TRS-SUC IM: CPT | Performed by: INTERNAL MEDICINE

## 2024-05-31 PROCEDURE — 90677 PCV20 VACCINE IM: CPT | Performed by: INTERNAL MEDICINE

## 2024-05-31 PROCEDURE — 90471 IMMUNIZATION ADMIN: CPT | Performed by: INTERNAL MEDICINE

## 2024-05-31 PROCEDURE — 73562 X-RAY EXAM OF KNEE 3: CPT | Mod: TC | Performed by: RADIOLOGY

## 2024-05-31 PROCEDURE — 80053 COMPREHEN METABOLIC PANEL: CPT | Performed by: INTERNAL MEDICINE

## 2024-05-31 PROCEDURE — 85025 COMPLETE CBC W/AUTO DIFF WBC: CPT | Performed by: INTERNAL MEDICINE

## 2024-05-31 PROCEDURE — 90480 ADMN SARSCOV2 VAC 1/ONLY CMP: CPT | Performed by: INTERNAL MEDICINE

## 2024-05-31 PROCEDURE — 83036 HEMOGLOBIN GLYCOSYLATED A1C: CPT | Performed by: INTERNAL MEDICINE

## 2024-05-31 PROCEDURE — G0103 PSA SCREENING: HCPCS | Performed by: INTERNAL MEDICINE

## 2024-05-31 PROCEDURE — 36415 COLL VENOUS BLD VENIPUNCTURE: CPT | Performed by: INTERNAL MEDICINE

## 2024-05-31 PROCEDURE — 99214 OFFICE O/P EST MOD 30 MIN: CPT | Mod: 25 | Performed by: INTERNAL MEDICINE

## 2024-05-31 PROCEDURE — 99396 PREV VISIT EST AGE 40-64: CPT | Mod: 25 | Performed by: INTERNAL MEDICINE

## 2024-05-31 PROCEDURE — 80061 LIPID PANEL: CPT | Performed by: INTERNAL MEDICINE

## 2024-05-31 RX ORDER — ATORVASTATIN CALCIUM 80 MG/1
80 TABLET, FILM COATED ORAL DAILY
Qty: 90 TABLET | Refills: 4 | Status: SHIPPED | OUTPATIENT
Start: 2024-05-31

## 2024-05-31 RX ORDER — CARVEDILOL 3.12 MG/1
3.12 TABLET ORAL 2 TIMES DAILY WITH MEALS
Qty: 180 TABLET | Refills: 4 | Status: SHIPPED | OUTPATIENT
Start: 2024-05-31

## 2024-05-31 SDOH — HEALTH STABILITY: PHYSICAL HEALTH: ON AVERAGE, HOW MANY DAYS PER WEEK DO YOU ENGAGE IN MODERATE TO STRENUOUS EXERCISE (LIKE A BRISK WALK)?: 1 DAY

## 2024-05-31 SDOH — HEALTH STABILITY: PHYSICAL HEALTH: ON AVERAGE, HOW MANY MINUTES DO YOU ENGAGE IN EXERCISE AT THIS LEVEL?: 30 MIN

## 2024-05-31 ASSESSMENT — SOCIAL DETERMINANTS OF HEALTH (SDOH): HOW OFTEN DO YOU GET TOGETHER WITH FRIENDS OR RELATIVES?: ONCE A WEEK

## 2024-05-31 NOTE — PATIENT INSTRUCTIONS
- Our team will contact you via [employer] (if you sign up for it), telephone call (if results are urgent), or otherwise via letter in the mail with the results of today's lab tests once I have a chance to review them    - SEE YOUR CARDIOLOGIST    - Don't forget to return the stool kit mailed to you so we can screen you for colon cancer!

## 2024-05-31 NOTE — LETTER
[facility]  [address] [address]  Phone: [phone]       6/3/2024      Viviana Gilman  [address] [facility] [address]        Dear Mr. Viviana Gilman:    I am writing to inform you of the results of the laboratory tests you had done recently. Your electrolytes and kidneys look good. Your liver looks good. Your cholesterol is at goal on your current medications. Your blood counts are stable. Your PSA (prostate cancer screening test) is normal.    Your hemoglobin A1c is in the pre-diabetes range, indicating you have pre-diabetes. This means that while you do not have diabetes right now, you are at an elevated risk to develop it in the future. Weight loss can be very beneficial at this stage in order to help prevent the progression to diabetes and I encourage you to work on losing weight through dietary changes such as smaller portions, cutting out excessive sweets, etc.    Don't forget to return the stool kit mailed to you so we can screen you for colon cancer!    If you have any further questions or problems, please contact our nurse line at [phone]. An even easier way to get ahold of our team is through [employer]it, which you can sign up for at [email or web address]. MyChart is not only a great way to communicate with us, but also allows you to see your full results.        Sincerely,        Robb Faulkner MD, MPH  Department of Internal Medicine  [facility]

## 2024-05-31 NOTE — PROGRESS NOTES
Preventive Care Visit  [facility]  Robb Faulkner MD, Internal Medicine  May 31, 2024    Assessment & Plan   Routine general medical examination at a health care facility  Reviewed PMH. Discussed healthcare maintenance issues, including cancer screenings, relevant immunizations, and cardiac risk factor screenings such as for cholesterol, HTN, and DM.    Acute pain of left knee  S/p ACL reconstruction ~30 years ago. No acute injury causing this acute pain. Will screen XR today and request ortho consult. Referral placed.  - Orthopedic  Referral; Future  - XR Knee Left 3 Views; Future    Noncompliance with medication regimen  Stressful life event affecting family  I had a go and open conversation with Viviana about his non-compliance. He filled his lisinopril prescription for a 90 day supply ~12 months ago, his carvedilol prescription for a 90 day supply ~6 months ago, and his atorvastatin prescription for a 90 day supply ~5 months ago. He has not seen a cardiologist in 3+ years despite me asking him to do so at each physical we've done together. During this conversation, Viviana disclosed that he is dealing with some depression related to half of his family still being in [place]. He has 2 children with him here in the [place] and 1 child + partner still in [facility]. He is not confident the [place] immigration system will reunite them anytime soon. This has understandably caused him significant sadness and stress. He denies MDD. He declines discussion about mood medications today. He declines a therapy referral, telling me he feels adequately supported by his social support. I made it clear to Viviana that he can reach out to my office whenever if he has concerns about his mood, about his medications, about his health, and I'm happy to see him or talk to him about anything. I did ask he try to be better about taking care of himself by taking his medications regularly and seeing his care team regularly.  "He was in agreement.    Coronary artery disease involving native coronary artery of native heart without angina pectoris  Known issue that I take into account for their medical decisions, no current exacerbations or new concerns. Needs to re-establish with cardiology. Has appointment later this year. I asked him to please see cardiology again. Refilled Coreg. BP at goal today, will hold on lisinopril.  - CBC with Platelets & Differential; Future  - carvedilol (COREG) 3.125 MG tablet; Take 1 tablet (3.125 mg) by mouth 2 times daily (with meals)    Essential hypertension  Needs to re-establish with cardiology. Has appointment later this year. I asked him to please see cardiology again. Refilled Coreg. BP at goal today, will hold on lisinopril.  - carvedilol (COREG) 3.125 MG tablet; Take 1 tablet (3.125 mg) by mouth 2 times daily (with meals)    Hyperlipidemia LDL goal <70  Fasting labs today. Refilled atorvastatin.  - Comprehensive metabolic panel; Future  - Lipid panel reflex to direct LDL Fasting; Future  - atorvastatin (LIPITOR) 80 MG tablet; Take 1 tablet (80 mg) by mouth daily    Prediabetes  Seen on past metabolic panel. Will better characterize with A1c.  - Hemoglobin A1c; Future    Screening for prostate cancer  PSA WNL in past.  - Prostate Specific Antigen Screen; Future    Screen for colon cancer  Did FIT last year. He was agreeable to Cologuard this year.  - COLOGUARD([employer]); Future    Encounter for immunization  - Pneumococcal 20 Valent Conjugate (Prevnar 20)  - COVID-19 12+ (2023-24) (PFIZER)    BMI  Estimated body mass index is 27.82 kg/m  as calculated from the following:    Height as of this encounter: 1.702 m (5' 7\").    Weight as of this encounter: 80.6 kg (177 lb 9.6 oz).     Counseling  Appropriate preventive services were discussed with this patient, including applicable screening as appropriate for fall prevention, nutrition, physical activity, Tobacco-use cessation, weight loss and " cognition.  Checklist reviewing preventive services available has been given to the patient. Reviewed patient's diet, addressing concerns and/or questions.   He is at risk for lack of exercise and has been provided with information to increase physical activity for the benefit of his well-being.     Signed Electronically by:  Robb Faulkner MD, MPH  [facility]  Internal Medicine    Subjective   Viviana is a 58 year old presenting for the following: Physical and Knee Pain    Viviana presents today for a physical exam. We also discussed knee pain. Ongoing for ~2 weeks. Tore his ACL in that knee ~30 years ago which was surgically repaired. No injury this time. Just hurts when standing and walking. Knee has not swelled up. No rash.        5/31/2024   General Health   How would you rate your overall physical health? Good   Feel stress (tense, anxious, or unable to sleep) Not at all         5/31/2024   Nutrition   Three or more servings of calcium each day? Yes   Diet: Low fat/cholesterol    Gluten-free/reduced   How many servings of fruit and vegetables per day? (!) 2-3   How many sweetened beverages each day? 0-1         5/31/2024   Exercise   Days per week of moderate/strenous exercise 1 day   Average minutes spent exercising at this level 30 min   (!) EXERCISE CONCERN      5/31/2024   Social Factors   Frequency of gathering with friends or relatives Once a week   Worry food won't last until get money to buy more No   Food not last or not have enough money for food? No   Do you have housing?  Yes   Are you worried about losing your housing? No   Lack of transportation? No   Unable to get utilities (heat,electricity)? No         5/31/2024   Fall Risk   Fallen 2 or more times in the past year? No   Trouble with walking or balance? Yes   Patient declined gait speed test      5/31/2024   Dental   Dentist two times every year? Yes         5/31/2024   TB Screening   Were you born outside of the  [place]? Yes     Today's PHQ-2 Score:       5/31/2024     7:51 AM   PHQ-2 ( 1999 [employer])   Q1: Little interest or pleasure in doing things 1   Q2: Feeling down, depressed or hopeless 0   PHQ-2 Score 1   Q1: Little interest or pleasure in doing things Several days   Q2: Feeling down, depressed or hopeless Not at all   PHQ-2 Score 1         5/31/2024   Substance Use   Alcohol more than 3/day or more than 7/wk No   Do you use any other substances recreationally? (!) PRESCRIPTION DRUGS     Social History     Tobacco Use    Smoking status: Never    Smokeless tobacco: Never   Vaping Use    Vaping status: Never Used   Substance Use Topics    Alcohol use: Not Currently     Alcohol/week: 0.0 standard drinks of alcohol     Comment: Occaisionally    Drug use: No         5/31/2024   One time HIV Screening   Previous HIV test? Yes         5/31/2024   STI Screening   New sexual partner(s) since last STI/HIV test? No   Last PSA:   Prostate Specific Antigen Screen   Date Value Ref Range Status   03/28/2023 0.15 0.00 - 3.50 ng/mL Final   11/16/2021 0.15 0.00 - 4.00 ug/L Final     ASCVD Risk   The ASCVD Risk score (Fadia NERI, et al., 2019) failed to calculate for the following reasons:    The patient has a prior MI or stroke diagnosis    Reviewed and updated as needed this visit by Provider   Tobacco  Allergies  Meds  Problems  Med Hx  Surg Hx  Fam Hx             Objective    Exam  /72   Pulse 55   Temp 96.8  F (36  C) (Temporal)   Ht 1.702 m (5' 7\")   Wt 80.6 kg (177 lb 9.6 oz)   SpO2 100%   BMI 27.82 kg/m     Estimated body mass index is 27.82 kg/m  as calculated from the following:    Height as of this encounter: 1.702 m (5' 7\").    Weight as of this encounter: 80.6 kg (177 lb 9.6 oz).    Physical Exam  GENERAL: In no distress.  EYES: Conjunctivae/corneas clear. EOMs grossly intact.  HENT: NC/AT, facies symmetric. Neck supple. No LAD or thyromegaly noted.  RESP: CTAB. No w/r/r.  CV: RRR, no m/r/g. HR in " 50s.  GI: NT, ND, without rebound or guarding, no CVA tenderness.  MSK: Moves all four extremities freely. L knee: No joint swelling noted. No overlying erythema noted. Joint line not TTP. Anterior and posterior drawers negative. Varus/valgus negative.  SKIN: No significant ulcers, lesions or rashes on the visualized portions of the skin  NEURO: Alert. Oriented.  PSYCH: Linear thought process. Speech normal rate and volume. No tangential thoughts, hallucinations, or delusions.

## 2024-06-01 LAB
ALBUMIN SERPL BCG-MCNC: 4.5 G/DL (ref 3.5–5.2)
ALP SERPL-CCNC: 79 U/L (ref 40–150)
ALT SERPL W P-5'-P-CCNC: 28 U/L (ref 0–70)
ANION GAP SERPL CALCULATED.3IONS-SCNC: 9 MMOL/L (ref 7–15)
AST SERPL W P-5'-P-CCNC: 23 U/L (ref 0–45)
BILIRUB SERPL-MCNC: 0.3 MG/DL
BUN SERPL-MCNC: 21.1 MG/DL (ref 6–20)
CALCIUM SERPL-MCNC: 9.4 MG/DL (ref 8.6–10)
CHLORIDE SERPL-SCNC: 106 MMOL/L (ref 98–107)
CHOLEST SERPL-MCNC: 138 MG/DL
CREAT SERPL-MCNC: 0.96 MG/DL (ref 0.67–1.17)
DEPRECATED HCO3 PLAS-SCNC: 27 MMOL/L (ref 22–29)
EGFRCR SERPLBLD CKD-EPI 2021: >90 ML/MIN/1.73M2
FASTING STATUS PATIENT QL REPORTED: YES
FASTING STATUS PATIENT QL REPORTED: YES
GLUCOSE SERPL-MCNC: 104 MG/DL (ref 70–99)
HDLC SERPL-MCNC: 44 MG/DL
LDLC SERPL CALC-MCNC: 67 MG/DL
NONHDLC SERPL-MCNC: 94 MG/DL
POTASSIUM SERPL-SCNC: 3.9 MMOL/L (ref 3.4–5.3)
PROT SERPL-MCNC: 7.3 G/DL (ref 6.4–8.3)
PSA SERPL DL<=0.01 NG/ML-MCNC: 0.15 NG/ML (ref 0–3.5)
SODIUM SERPL-SCNC: 142 MMOL/L (ref 135–145)
TRIGL SERPL-MCNC: 134 MG/DL

## 2024-06-04 ENCOUNTER — OFFICE VISIT (OUTPATIENT)
Dept: ORTHOPEDICS | Facility: CLINIC | Age: 59
End: 2024-06-04
Payer: COMMERCIAL

## 2024-06-04 VITALS
BODY MASS INDEX: 27.78 KG/M2 | DIASTOLIC BLOOD PRESSURE: 92 MMHG | WEIGHT: 177 LBS | HEIGHT: 67 IN | SYSTOLIC BLOOD PRESSURE: 158 MMHG

## 2024-06-04 DIAGNOSIS — M17.32 POST-TRAUMATIC OSTEOARTHRITIS OF LEFT KNEE: Primary | ICD-10-CM

## 2024-06-04 DIAGNOSIS — Z98.890 HX OF ANTERIOR CRUCIATE LIGAMENT TEAR RECONSTRUCTION: ICD-10-CM

## 2024-06-04 PROCEDURE — 99203 OFFICE O/P NEW LOW 30 MIN: CPT | Performed by: STUDENT IN AN ORGANIZED HEALTH CARE EDUCATION/TRAINING PROGRAM

## 2024-06-04 NOTE — LETTER
6/4/2024      Viviana Gilman  [address] [place] [address]      Dear Colleague,    Thank you for referring your patient, Viviana Gilman, to the [facility]. Please see a copy of my visit note below.    ASSESSMENT & PLAN    Viviana was seen today for pain.    Diagnoses and all orders for this visit:    Post-traumatic osteoarthritis of left knee  -     Orthopedic  Referral    Hx of anterior cruciate ligament tear reconstruction      This issue is acute and Improving. Viviana presents with clinic today to discuss his acute left knee pain.  The patient reports that 2 weeks ago he began to have left-sided, lateral knee pain without injury.  He does have a history of an ACL reconstruction, and previous radiographs were reviewed and show tricompartmental mild to moderate osteoarthritis.  However, patient's pain has greatly improved without intervention since he has made his appointment, and exam maneuvers today are not able to provoke any symptoms.  Given this, no intervention is needed today.  We discussed the utility of physical therapy for the long-term strengthening and maximizing function of his knee, however the patient chose to defer this at this time.  We determined the following plan:  - Patient can use over-the-counter pain medicines, ice, and heat as needed  - He can send us a [employer] message or phone call if he chooses to begin physical therapy at any time  - He can follow-up in our clinic as needed    Gonzalez Church,   [facility]    -----  Chief Complaint   Patient presents with     Left Knee - Pain       SUBJECTIVE  Viviana Gilman is a/an 58 year old male who is seen in consultation at the request of  Robb Barron M.D. for evaluation of left knee pain. Patient states his knee has mone feeling better for the past few days.    The patient is seen by themselves.    Onset: 2 week(s) ago. Reports insidious onset  "without acute precipitating event.  Location of Pain: left lateral knee radiating up leg  Worsened by: walking, prolonged standing  Better with: rest  Treatments tried: no treatment tried to date  Associated symptoms: feeling of instability    Orthopedic/Surgical history: torn ACL 30 years  Social History/Occupation:       REVIEW OF SYSTEMS:  Review of systems negative unless mentioned in HPI     OBJECTIVE:  BP (!) 158/92   Ht 1.702 m (5' 7\")   Wt 80.3 kg (177 lb)   BMI 27.72 kg/m     General: healthy, alert and in no distress  Skin: no suspicious lesions or rash.  CV: distal perfusion intact   Resp: normal respiratory effort without conversational dyspnea   Psych: normal mood and affect  Gait: NORMAL  Neuro: Normal light sensory exam of LL extremity     Left Knee exam  Gait: Normal  Alignment:  [x] Normal  [] Anatomic valgus  [] Anatomic varus  Inspection: [x] Normal  [] Ecchymosis present []Other   Palpation:  Joint Tenderness: [x] No Tenderness  [] MJL [] LJL [] MCL Margin [] LCL Margin [] Pes Anserine [] Distal Quadricep  [] Other   Peripatellar Tenderness: [x] None [] Lateral pole [] Medial pole [] Superior pole [] Inferior pole    Patellar tendon pain: [x] None [] Present    Patellar apprehension: [x] None [] Present    Patellar motion: [x] Normal [] Abnormal  Crepitus: [x] None [] Present  Effusion: [x] None [] Trace [] 1+ [] 2+ [] 3+  Range of motion:  Flexion: 135, Extension: 0  Strength:  [x] Full in all planes, including intact extensor mechanism [] Limited as described  Neurologic: Normal sensation   Vascular: Normal pulses   Special tests:   Lachman: Negative  Anterior Drawer: Negative  Sag/quad Activation: NP  Posterior Drawer: Negative  Valgus Stress: Negative at 0/30  Varus Stress: Negative at 0/30  Giancarlo's: Negative  Thessaly: NP     Other notable findings/comments: None       RADIOLOGY:  Final results and radiologist's interpretation, available in the [facility] " record.  Images were reviewed with the patient in the office today.  My personal interpretation of the performed imaging: Radiographs from 5/31/2024 reviewed and show mild to moderate joint space narrowing and osteophytosis of all 3 compartments of the knee.  Hardware consistent with previous ACL reconstruction present.  No acute bony abnormalities.        Again, thank you for allowing me to participate in the care of your patient.        Sincerely,        Gonzalez Church, DO

## 2024-06-04 NOTE — PROGRESS NOTES
ASSESSMENT & PLAN    Viviana was seen today for pain.    Diagnoses and all orders for this visit:    Post-traumatic osteoarthritis of left knee  -     Orthopedic  Referral    Hx of anterior cruciate ligament tear reconstruction      This issue is acute and Improving. Viviana presents with clinic today to discuss his acute left knee pain.  The patient reports that 2 weeks ago he began to have left-sided, lateral knee pain without injury.  He does have a history of an ACL reconstruction, and previous radiographs were reviewed and show tricompartmental mild to moderate osteoarthritis.  However, patient's pain has greatly improved without intervention since he has made his appointment, and exam maneuvers today are not able to provoke any symptoms.  Given this, no intervention is needed today.  We discussed the utility of physical therapy for the long-term strengthening and maximizing function of his knee, however the patient chose to defer this at this time.  We determined the following plan:  - Patient can use over-the-counter pain medicines, ice, and heat as needed  - He can send us a [employer] message or phone call if he chooses to begin physical therapy at any time  - He can follow-up in our clinic as needed    Gonzalez Church [facility] [place]    -----  Chief Complaint   Patient presents with    Left Knee - Pain       SUBJECTIVE  Viviana Gilman is a/an 58 year old male who is seen in consultation at the request of  Robb Barron M.D. for evaluation of left knee pain. Patient states his knee has mone feeling better for the past few days.    The patient is seen by themselves.    Onset: 2 week(s) ago. Reports insidious onset without acute precipitating event.  Location of Pain: left lateral knee radiating up leg  Worsened by: walking, prolonged standing  Better with: rest  Treatments tried: no treatment tried to date  Associated symptoms: feeling of  "instability    Orthopedic/Surgical history: torn ACL 30 years  Social History/Occupation:       REVIEW OF SYSTEMS:  Review of systems negative unless mentioned in HPI     OBJECTIVE:  BP (!) 158/92   Ht 1.702 m (5' 7\")   Wt 80.3 kg (177 lb)   BMI 27.72 kg/m     General: healthy, alert and in no distress  Skin: no suspicious lesions or rash.  CV: distal perfusion intact   Resp: normal respiratory effort without conversational dyspnea   Psych: normal mood and affect  Gait: NORMAL  Neuro: Normal light sensory exam of LL extremity     Left Knee exam  Gait: Normal  Alignment:  [x] Normal  [] Anatomic valgus  [] Anatomic varus  Inspection: [x] Normal  [] Ecchymosis present []Other   Palpation:  Joint Tenderness: [x] No Tenderness  [] MJL [] LJL [] MCL Margin [] LCL Margin [] Pes Anserine [] Distal Quadricep  [] Other   Peripatellar Tenderness: [x] None [] Lateral pole [] Medial pole [] Superior pole [] Inferior pole    Patellar tendon pain: [x] None [] Present    Patellar apprehension: [x] None [] Present    Patellar motion: [x] Normal [] Abnormal  Crepitus: [x] None [] Present  Effusion: [x] None [] Trace [] 1+ [] 2+ [] 3+  Range of motion:  Flexion: 135, Extension: 0  Strength:  [x] Full in all planes, including intact extensor mechanism [] Limited as described  Neurologic: Normal sensation   Vascular: Normal pulses   Special tests:   Lachman: Negative  Anterior Drawer: Negative  Sag/quad Activation: NP  Posterior Drawer: Negative  Valgus Stress: Negative at 0/30  Varus Stress: Negative at 0/30  Giancarlo's: Negative  Thessaly: NP     Other notable findings/comments: None       RADIOLOGY:  Final results and radiologist's interpretation, available in the [facility] health record.  Images were reviewed with the patient in the office today.  My personal interpretation of the performed imaging: Radiographs from 5/31/2024 reviewed and show mild to moderate joint space narrowing and osteophytosis of all 3 " compartments of the knee.  Hardware consistent with previous ACL reconstruction present.  No acute bony abnormalities.

## 2024-06-10 ENCOUNTER — TRANSFERRED RECORDS (OUTPATIENT)
Dept: HEALTH INFORMATION MANAGEMENT | Facility: CLINIC | Age: 59
End: 2024-06-10
Payer: COMMERCIAL

## 2024-06-10 LAB — RETINOPATHY: NEGATIVE

## 2024-06-22 LAB — NONINV COLON CA DNA+OCC BLD SCRN STL QL: NEGATIVE

## 2024-10-09 ENCOUNTER — OFFICE VISIT (OUTPATIENT)
Dept: CARDIOLOGY | Facility: CLINIC | Age: 59
End: 2024-10-09
Payer: COMMERCIAL

## 2024-10-09 VITALS
HEART RATE: 54 BPM | BODY MASS INDEX: 27.75 KG/M2 | WEIGHT: 177.2 LBS | OXYGEN SATURATION: 99 % | SYSTOLIC BLOOD PRESSURE: 148 MMHG | DIASTOLIC BLOOD PRESSURE: 85 MMHG

## 2024-10-09 DIAGNOSIS — I25.10 CORONARY ARTERY DISEASE INVOLVING NATIVE CORONARY ARTERY OF NATIVE HEART WITHOUT ANGINA PECTORIS: Primary | ICD-10-CM

## 2024-10-09 PROCEDURE — 99202 OFFICE O/P NEW SF 15 MIN: CPT | Performed by: INTERNAL MEDICINE

## 2024-10-09 NOTE — LETTER
10/9/2024    Dorys Vanessa PA-C  [address]  [facility] [address]    RE: Viviana Elliottsaundra Mukul       Dear Colleague,     I had the pleasure of seeing Viviana Karen Mukul in the [facility].  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  He should stay on his maximum dose statin plus aspirin.  Would like good blood pressure control.  He did not take his blood pressure medications this morning.  May need increase of his lisinopril in the future.      Asymptomatic bradycardia  If symptoms related to his bradycardia or carvedilol, may discontinue in the future.            Follow-up in cardiology 2 years, sooner if symptoms arise.        20 minutes was spent with the patient, precharting and reviewing tests as well as post visit charting all done today..    This note was transcribed using electronic voice recognition software and there may be typographical errors present.     The longitudinal plan of care for the diagnosis(es)/condition(s) as documented were addressed during this visit. Due to the added complexity in care, I will continue to support Viviana in the subsequent management and with ongoing continuity of care.                    Interval History:     The patient is a very pleasant 59 year old whom I have been following for coronary artery disease status post bifurcation stenting of the LAD and circumflex 2019 with previous PCI.  He has had some intermittent medication noncompliance.  He did not take his medication today.  He just reestablished taking his medications with his primary clinician.  He has resting bradycardia but no symptoms with that.  Denies any lightheadedness or fatigue  when restarting his medications.  Denies any exertional chest discomfort.  No cardiovascular symptoms that I can ascertain at this point.                     Review of Systems:     Review of Systems:  Skin:  not assessed     Eyes:  not assessed    ENT:  not assessed    Respiratory:  Negative    Cardiovascular:  Negative    Gastroenterology: not assessed    Genitourinary:  not assessed    Musculoskeletal:  not assessed    Neurologic:  not assessed    Psychiatric:  not assessed    Heme/Lymph/Imm:  not assessed    Endocrine:  not assessed                Physical Exam:     Vitals: BP (!) 148/85 (BP Location: Right arm, Patient Position: Sitting, Cuff Size: Adult Regular)   Pulse 54   Wt 80.4 kg (177 lb 3.2 oz)   SpO2 99%   BMI 27.75 kg/m    Constitutional:  cooperative, alert and oriented, well developed, well nourished, in no acute distress        Skin:  warm and dry to the touch, no apparent skin lesions or masses noted        Head:  normocephalic, no masses or lesions        Eyes:  pupils equal and round, conjunctivae and lids unremarkable, sclera white, no xanthalasma, EOMS intact, no nystagmus        Chest:  normal breath sounds, clear to auscultation, normal A-P diameter, normal symmetry, normal respiratory excursion, no use of accessory muscles        Cardiac: regular rhythm;no murmurs, gallops or rubs detected                  Extremities and Back:  no deformities, clubbing, cyanosis, erythema observed;no edema        Neurological:  no gross motor deficits;affect appropriate                 Medications:     Current Outpatient Medications   Medication Sig Dispense Refill     aspirin 81 MG EC tablet Take 1 tablet (81 mg) by mouth daily 90 tablet 2     atorvastatin (LIPITOR) 80 MG tablet Take 1 tablet (80 mg) by mouth daily 90 tablet 4     carvedilol (COREG) 3.125 MG tablet Take 1 tablet (3.125 mg) by mouth 2 times daily (with meals) 180 tablet 4     lisinopril (ZESTRIL) 10 MG tablet Take 1 tablet (10 mg) by  "mouth daily 90 tablet 1     nitroGLYcerin (NITROSTAT) 0.4 MG sublingual tablet For chest pain place 1 tablet under the tongue repeat every 5 minutes for 3 doses if needed. 25 tablet 1                Data:   All laboratory data reviewed  No results found for this or any previous visit (from the past 24 hour(s)).    All laboratory data reviewed  Lab Results   Component Value Date    CHOL 138 05/31/2024    CHOL 119 10/04/2019     Lab Results   Component Value Date    HDL 44 05/31/2024    HDL 47 10/04/2019     Lab Results   Component Value Date    LDL 67 05/31/2024    LDL 54 10/04/2019     Lab Results   Component Value Date    TRIG 134 05/31/2024    TRIG 91 10/04/2019     Lab Results   Component Value Date    CHOLHDLRATIO 3.4 11/20/2012     No results found for: \"TSH\"  Last Basic Metabolic Panel:  Lab Results   Component Value Date     05/31/2024     03/06/2019      Lab Results   Component Value Date    POTASSIUM 3.9 05/31/2024    POTASSIUM 4.4 11/16/2021    POTASSIUM 3.6 03/06/2019     Lab Results   Component Value Date    CHLORIDE 106 05/31/2024    CHLORIDE 110 11/16/2021    CHLORIDE 108 03/06/2019     Lab Results   Component Value Date    JAY 9.4 05/31/2024    JAY 8.4 03/06/2019     Lab Results   Component Value Date    CO2 27 05/31/2024    CO2 31 11/16/2021    CO2 22 03/06/2019     Lab Results   Component Value Date    BUN 21.1 05/31/2024    BUN 19 11/16/2021    BUN 12 03/06/2019     Lab Results   Component Value Date    CR 0.96 05/31/2024    CR 0.76 03/06/2019     Lab Results   Component Value Date     05/31/2024     11/16/2021    GLC 92 03/06/2019     Lab Results   Component Value Date    WBC 5.0 05/31/2024    WBC 8.6 03/06/2019     Lab Results   Component Value Date    RBC 5.08 05/31/2024    RBC 5.17 03/06/2019     Lab Results   Component Value Date    HGB 13.9 05/31/2024    HGB 14.2 03/06/2019     Lab Results   Component Value Date    HCT 43.1 05/31/2024    HCT 43.3 03/06/2019     Lab " Results   Component Value Date    MCV 85 05/31/2024    MCV 84 03/06/2019     Lab Results   Component Value Date    MCH 27.4 05/31/2024    MCH 26.7 03/06/2019     Lab Results   Component Value Date    MCHC 32.3 05/31/2024    MCHC 31.9 03/06/2019     Lab Results   Component Value Date    RDW 14.0 05/31/2024    RDW 14.3 03/06/2019     Lab Results   Component Value Date     05/31/2024     03/06/2019               Thank you for allowing me to participate in the care of your patient.      Sincerely,     Amauri Fleming MD     [facility]  cc:   Dorys Vanessa PA-C  [address] [place] [address]

## 2024-10-09 NOTE — PROGRESS NOTES
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  He should stay on his maximum dose statin plus aspirin.  Would like good blood pressure control.  He did not take his blood pressure medications this morning.  May need increase of his lisinopril in the future.      Asymptomatic bradycardia  If symptoms related to his bradycardia or carvedilol, may discontinue in the future.            Follow-up in cardiology 2 years, sooner if symptoms arise.        20 minutes was spent with the patient, precharting and reviewing tests as well as post visit charting all done today..    This note was transcribed using electronic voice recognition software and there may be typographical errors present.     The longitudinal plan of care for the diagnosis(es)/condition(s) as documented were addressed during this visit. Due to the added complexity in care, I will continue to support Viviana in the subsequent management and with ongoing continuity of care.                    Interval History:     The patient is a very pleasant 59 year old whom I have been following for coronary artery disease status post bifurcation stenting of the LAD and circumflex 2019 with previous PCI.  He has had some intermittent medication noncompliance.  He did not take his medication today.  He just reestablished taking his medications with his primary clinician.  He has resting bradycardia but no symptoms with that.  Denies any lightheadedness or fatigue when restarting his medications.  Denies any exertional chest discomfort.  No cardiovascular symptoms that I can ascertain at this point.                     Review of Systems:     Review of Systems:  Skin:   not assessed     Eyes:  not assessed    ENT:  not assessed    Respiratory:  Negative    Cardiovascular:  Negative    Gastroenterology: not assessed    Genitourinary:  not assessed    Musculoskeletal:  not assessed    Neurologic:  not assessed    Psychiatric:  not assessed    Heme/Lymph/Imm:  not assessed    Endocrine:  not assessed                Physical Exam:     Vitals: BP (!) 148/85 (BP Location: Right arm, Patient Position: Sitting, Cuff Size: Adult Regular)   Pulse 54   Wt 80.4 kg (177 lb 3.2 oz)   SpO2 99%   BMI 27.75 kg/m    Constitutional:  cooperative, alert and oriented, well developed, well nourished, in no acute distress        Skin:  warm and dry to the touch, no apparent skin lesions or masses noted        Head:  normocephalic, no masses or lesions        Eyes:  pupils equal and round, conjunctivae and lids unremarkable, sclera white, no xanthalasma, EOMS intact, no nystagmus        Chest:  normal breath sounds, clear to auscultation, normal A-P diameter, normal symmetry, normal respiratory excursion, no use of accessory muscles        Cardiac: regular rhythm;no murmurs, gallops or rubs detected                  Extremities and Back:  no deformities, clubbing, cyanosis, erythema observed;no edema        Neurological:  no gross motor deficits;affect appropriate                 Medications:     Current Outpatient Medications   Medication Sig Dispense Refill    aspirin 81 MG EC tablet Take 1 tablet (81 mg) by mouth daily 90 tablet 2    atorvastatin (LIPITOR) 80 MG tablet Take 1 tablet (80 mg) by mouth daily 90 tablet 4    carvedilol (COREG) 3.125 MG tablet Take 1 tablet (3.125 mg) by mouth 2 times daily (with meals) 180 tablet 4    lisinopril (ZESTRIL) 10 MG tablet Take 1 tablet (10 mg) by mouth daily 90 tablet 1    nitroGLYcerin (NITROSTAT) 0.4 MG sublingual tablet For chest pain place 1 tablet under the tongue repeat every 5 minutes for 3 doses if needed. 25 tablet 1                Data:   All  "laboratory data reviewed  No results found for this or any previous visit (from the past 24 hour(s)).    All laboratory data reviewed  Lab Results   Component Value Date    CHOL 138 05/31/2024    CHOL 119 10/04/2019     Lab Results   Component Value Date    HDL 44 05/31/2024    HDL 47 10/04/2019     Lab Results   Component Value Date    LDL 67 05/31/2024    LDL 54 10/04/2019     Lab Results   Component Value Date    TRIG 134 05/31/2024    TRIG 91 10/04/2019     Lab Results   Component Value Date    CHOLHDLRATIO 3.4 11/20/2012     No results found for: \"TSH\"  Last Basic Metabolic Panel:  Lab Results   Component Value Date     05/31/2024     03/06/2019      Lab Results   Component Value Date    POTASSIUM 3.9 05/31/2024    POTASSIUM 4.4 11/16/2021    POTASSIUM 3.6 03/06/2019     Lab Results   Component Value Date    CHLORIDE 106 05/31/2024    CHLORIDE 110 11/16/2021    CHLORIDE 108 03/06/2019     Lab Results   Component Value Date    JAY 9.4 05/31/2024    JAY 8.4 03/06/2019     Lab Results   Component Value Date    CO2 27 05/31/2024    CO2 31 11/16/2021    CO2 22 03/06/2019     Lab Results   Component Value Date    BUN 21.1 05/31/2024    BUN 19 11/16/2021    BUN 12 03/06/2019     Lab Results   Component Value Date    CR 0.96 05/31/2024    CR 0.76 03/06/2019     Lab Results   Component Value Date     05/31/2024     11/16/2021    GLC 92 03/06/2019     Lab Results   Component Value Date    WBC 5.0 05/31/2024    WBC 8.6 03/06/2019     Lab Results   Component Value Date    RBC 5.08 05/31/2024    RBC 5.17 03/06/2019     Lab Results   Component Value Date    HGB 13.9 05/31/2024    HGB 14.2 03/06/2019     Lab Results   Component Value Date    HCT 43.1 05/31/2024    HCT 43.3 03/06/2019     Lab Results   Component Value Date    MCV 85 05/31/2024    MCV 84 03/06/2019     Lab Results   Component Value Date    MCH 27.4 05/31/2024    MCH 26.7 03/06/2019     Lab Results   Component Value Date    MCHC 32.3 " 05/31/2024    MCHC 31.9 03/06/2019     Lab Results   Component Value Date    RDW 14.0 05/31/2024    RDW 14.3 03/06/2019     Lab Results   Component Value Date     05/31/2024     03/06/2019

## 2025-05-01 ENCOUNTER — PATIENT OUTREACH (OUTPATIENT)
Dept: CARE COORDINATION | Facility: CLINIC | Age: 60
End: 2025-05-01
Payer: COMMERCIAL

## 2025-05-15 ENCOUNTER — PATIENT OUTREACH (OUTPATIENT)
Dept: CARE COORDINATION | Facility: CLINIC | Age: 60
End: 2025-05-15
Payer: COMMERCIAL

## 2025-07-31 DIAGNOSIS — I10 ESSENTIAL HYPERTENSION: ICD-10-CM

## 2025-07-31 DIAGNOSIS — I25.10 CORONARY ARTERY DISEASE INVOLVING NATIVE CORONARY ARTERY OF NATIVE HEART WITHOUT ANGINA PECTORIS: ICD-10-CM

## 2025-07-31 DIAGNOSIS — E78.5 HYPERLIPIDEMIA LDL GOAL <70: ICD-10-CM

## 2025-07-31 RX ORDER — CARVEDILOL 3.12 MG/1
3.12 TABLET ORAL 2 TIMES DAILY WITH MEALS
Qty: 180 TABLET | Refills: 4 | Status: CANCELLED | OUTPATIENT
Start: 2025-07-31

## 2025-07-31 RX ORDER — ATORVASTATIN CALCIUM 80 MG/1
80 TABLET, FILM COATED ORAL DAILY
Qty: 90 TABLET | Refills: 4 | Status: CANCELLED | OUTPATIENT
Start: 2025-07-31

## (undated) DEVICE — CATH ANGIO INFINITI 3DRC 4FRX100CM 538476

## (undated) DEVICE — CATH BALLOON EMERGE 2.0X12MM H7493918912200

## (undated) DEVICE — DEFIB PRO-PADZ LVP LQD GEL ADULT 8900-2105-01

## (undated) DEVICE — GUIDEWIRE VASC 0.035INX150CM INQWIRE J TIP IQ35F150J3F/A

## (undated) DEVICE — MANIFOLD KIT ANGIO AUTOMATED 014613

## (undated) DEVICE — CATH BALLOON EMERGE 2.5X15MM H7493918915250

## (undated) DEVICE — Device

## (undated) DEVICE — RAD INFLATOR BASIC COMPAK  IN4130

## (undated) DEVICE — KIT LG BORE TOUHY ACCESS PLUS MAP152

## (undated) DEVICE — CATH DIAG 4FR JL 4.5 538417

## (undated) DEVICE — CATH BALLOON NC EMERGE 3.00X15MM H7493926715300

## (undated) DEVICE — GUIDEWIRE VASC 0.014INX190CM J TIP CGRXT190HJ

## (undated) DEVICE — CATH BALLOON NC EMERGE 3.25X15MM H7493926715320

## (undated) DEVICE — RAD CLOSURE ANGIOSEAL 8FR  610131

## (undated) DEVICE — INTRO SHEATH 4FRX10CM PINNACLE RSS402

## (undated) DEVICE — KIT HAND CONTROL ANGIOTOUCH ACIST 65CM AT-P65